# Patient Record
Sex: FEMALE | Race: WHITE | NOT HISPANIC OR LATINO | Employment: FULL TIME | URBAN - METROPOLITAN AREA
[De-identification: names, ages, dates, MRNs, and addresses within clinical notes are randomized per-mention and may not be internally consistent; named-entity substitution may affect disease eponyms.]

---

## 2018-03-23 ENCOUNTER — APPOINTMENT (OUTPATIENT)
Dept: ADMISSIONS | Facility: MEDICAL CENTER | Age: 58
End: 2018-03-23
Attending: ORTHOPAEDIC SURGERY
Payer: COMMERCIAL

## 2018-03-23 DIAGNOSIS — Z01.810 PRE-OPERATIVE CARDIOVASCULAR EXAMINATION: ICD-10-CM

## 2018-03-23 DIAGNOSIS — Z01.812 PRE-OPERATIVE LABORATORY EXAMINATION: ICD-10-CM

## 2018-03-23 LAB
EKG IMPRESSION: NORMAL
ERYTHROCYTE [DISTWIDTH] IN BLOOD BY AUTOMATED COUNT: 44.7 FL (ref 35.9–50)
HCT VFR BLD AUTO: 45.6 % (ref 37–47)
HGB BLD-MCNC: 15 G/DL (ref 12–16)
MCH RBC QN AUTO: 30.7 PG (ref 27–33)
MCHC RBC AUTO-ENTMCNC: 32.9 G/DL (ref 33.6–35)
MCV RBC AUTO: 93.3 FL (ref 81.4–97.8)
PLATELET # BLD AUTO: 273 K/UL (ref 164–446)
PMV BLD AUTO: 9.7 FL (ref 9–12.9)
RBC # BLD AUTO: 4.89 M/UL (ref 4.2–5.4)
WBC # BLD AUTO: 4 K/UL (ref 4.8–10.8)

## 2018-03-23 PROCEDURE — 85027 COMPLETE CBC AUTOMATED: CPT

## 2018-03-23 PROCEDURE — 93010 ELECTROCARDIOGRAM REPORT: CPT | Performed by: INTERNAL MEDICINE

## 2018-03-23 PROCEDURE — 36415 COLL VENOUS BLD VENIPUNCTURE: CPT

## 2018-03-23 PROCEDURE — 93005 ELECTROCARDIOGRAM TRACING: CPT

## 2018-03-23 RX ORDER — ACETAMINOPHEN 325 MG/1
650 TABLET ORAL EVERY 4 HOURS PRN
Status: ON HOLD | COMMUNITY
End: 2019-03-18

## 2018-03-23 NOTE — OR NURSING
Pre admit apt: Pt. Instructed to continue regularly prescribed medications through day before surgery.  Instructed to take the following medications, the day of surgery, with a sip of water per anesthesia protocol: Tylenol

## 2018-04-09 ENCOUNTER — HOSPITAL ENCOUNTER (OUTPATIENT)
Facility: MEDICAL CENTER | Age: 58
End: 2018-04-09
Attending: ORTHOPAEDIC SURGERY | Admitting: ORTHOPAEDIC SURGERY
Payer: COMMERCIAL

## 2018-04-09 VITALS
DIASTOLIC BLOOD PRESSURE: 82 MMHG | SYSTOLIC BLOOD PRESSURE: 132 MMHG | BODY MASS INDEX: 26.73 KG/M2 | HEART RATE: 62 BPM | HEIGHT: 68 IN | WEIGHT: 176.37 LBS | RESPIRATION RATE: 14 BRPM | TEMPERATURE: 96.8 F | OXYGEN SATURATION: 99 %

## 2018-04-09 DIAGNOSIS — G89.18 POSTOPERATIVE PAIN OF LEFT KNEE: ICD-10-CM

## 2018-04-09 DIAGNOSIS — M25.562 POSTOPERATIVE PAIN OF LEFT KNEE: ICD-10-CM

## 2018-04-09 PROCEDURE — 700111 HCHG RX REV CODE 636 W/ 250 OVERRIDE (IP)

## 2018-04-09 PROCEDURE — 700105 HCHG RX REV CODE 258: Performed by: ORTHOPAEDIC SURGERY

## 2018-04-09 PROCEDURE — 160046 HCHG PACU - 1ST 60 MINS PHASE II: Performed by: ORTHOPAEDIC SURGERY

## 2018-04-09 PROCEDURE — 160036 HCHG PACU - EA ADDL 30 MINS PHASE I: Performed by: ORTHOPAEDIC SURGERY

## 2018-04-09 PROCEDURE — A9270 NON-COVERED ITEM OR SERVICE: HCPCS

## 2018-04-09 PROCEDURE — 160025 RECOVERY II MINUTES (STATS): Performed by: ORTHOPAEDIC SURGERY

## 2018-04-09 PROCEDURE — 700101 HCHG RX REV CODE 250

## 2018-04-09 PROCEDURE — 160022 HCHG BLOCK: Performed by: ORTHOPAEDIC SURGERY

## 2018-04-09 PROCEDURE — 160048 HCHG OR STATISTICAL LEVEL 1-5: Performed by: ORTHOPAEDIC SURGERY

## 2018-04-09 PROCEDURE — 700102 HCHG RX REV CODE 250 W/ 637 OVERRIDE(OP)

## 2018-04-09 PROCEDURE — 502580 HCHG PACK, KNEE ARTHROSCOPY: Performed by: ORTHOPAEDIC SURGERY

## 2018-04-09 PROCEDURE — 160002 HCHG RECOVERY MINUTES (STAT): Performed by: ORTHOPAEDIC SURGERY

## 2018-04-09 PROCEDURE — 160035 HCHG PACU - 1ST 60 MINS PHASE I: Performed by: ORTHOPAEDIC SURGERY

## 2018-04-09 PROCEDURE — 500028 HCHG ARTHROWAND TURBOVAC 3.5/90 SUCT.: Performed by: ORTHOPAEDIC SURGERY

## 2018-04-09 PROCEDURE — 160029 HCHG SURGERY MINUTES - 1ST 30 MINS LEVEL 4: Performed by: ORTHOPAEDIC SURGERY

## 2018-04-09 PROCEDURE — 160041 HCHG SURGERY MINUTES - EA ADDL 1 MIN LEVEL 4: Performed by: ORTHOPAEDIC SURGERY

## 2018-04-09 PROCEDURE — 160009 HCHG ANES TIME/MIN: Performed by: ORTHOPAEDIC SURGERY

## 2018-04-09 PROCEDURE — 501838 HCHG SUTURE GENERAL: Performed by: ORTHOPAEDIC SURGERY

## 2018-04-09 RX ORDER — OXYCODONE HCL 5 MG/5 ML
SOLUTION, ORAL ORAL
Status: COMPLETED
Start: 2018-04-09 | End: 2018-04-09

## 2018-04-09 RX ORDER — LIDOCAINE HYDROCHLORIDE 10 MG/ML
INJECTION, SOLUTION INFILTRATION; PERINEURAL
Status: DISCONTINUED
Start: 2018-04-09 | End: 2018-04-09 | Stop reason: HOSPADM

## 2018-04-09 RX ORDER — ROPIVACAINE HYDROCHLORIDE 5 MG/ML
INJECTION, SOLUTION EPIDURAL; INFILTRATION; PERINEURAL
Status: DISCONTINUED
Start: 2018-04-09 | End: 2018-04-09 | Stop reason: HOSPADM

## 2018-04-09 RX ORDER — LEVOFLOXACIN 500 MG/1
500 TABLET, FILM COATED ORAL DAILY
Qty: 7 TAB | Refills: 0 | Status: SHIPPED | OUTPATIENT
Start: 2018-04-09 | End: 2018-04-16

## 2018-04-09 RX ORDER — ONDANSETRON 4 MG/1
4 TABLET, FILM COATED ORAL EVERY 4 HOURS PRN
Qty: 10 TAB | Refills: 2 | Status: SHIPPED | OUTPATIENT
Start: 2018-04-09 | End: 2018-04-19

## 2018-04-09 RX ORDER — HYDROCODONE BITARTRATE AND ACETAMINOPHEN 10; 325 MG/1; MG/1
1-2 TABLET ORAL EVERY 4 HOURS PRN
Qty: 60 TAB | Refills: 0 | Status: SHIPPED | OUTPATIENT
Start: 2018-04-09 | End: 2018-04-30

## 2018-04-09 RX ORDER — MIDAZOLAM HYDROCHLORIDE 1 MG/ML
INJECTION INTRAMUSCULAR; INTRAVENOUS
Status: DISCONTINUED
Start: 2018-04-09 | End: 2018-04-09 | Stop reason: HOSPADM

## 2018-04-09 RX ORDER — LIDOCAINE HYDROCHLORIDE 10 MG/ML
INJECTION, SOLUTION INFILTRATION; PERINEURAL
Status: COMPLETED
Start: 2018-04-09 | End: 2018-04-09

## 2018-04-09 RX ORDER — SODIUM CHLORIDE, SODIUM LACTATE, POTASSIUM CHLORIDE, CALCIUM CHLORIDE 600; 310; 30; 20 MG/100ML; MG/100ML; MG/100ML; MG/100ML
INJECTION, SOLUTION INTRAVENOUS
Status: DISCONTINUED | OUTPATIENT
Start: 2018-04-09 | End: 2018-04-09 | Stop reason: HOSPADM

## 2018-04-09 RX ORDER — ONDANSETRON 2 MG/ML
INJECTION INTRAMUSCULAR; INTRAVENOUS
Status: COMPLETED
Start: 2018-04-09 | End: 2018-04-09

## 2018-04-09 RX ADMIN — SODIUM CHLORIDE, POTASSIUM CHLORIDE, SODIUM LACTATE AND CALCIUM CHLORIDE: 600; 310; 30; 20 INJECTION, SOLUTION INTRAVENOUS at 10:00

## 2018-04-09 RX ADMIN — ONDANSETRON 4 MG: 2 INJECTION INTRAMUSCULAR; INTRAVENOUS at 09:40

## 2018-04-09 RX ADMIN — OXYCODONE HYDROCHLORIDE 5 MG: 5 SOLUTION ORAL at 09:05

## 2018-04-09 RX ADMIN — FENTANYL CITRATE 50 MCG: 50 INJECTION, SOLUTION INTRAMUSCULAR; INTRAVENOUS at 09:05

## 2018-04-09 RX ADMIN — SODIUM CHLORIDE, POTASSIUM CHLORIDE, SODIUM LACTATE AND CALCIUM CHLORIDE: 600; 310; 30; 20 INJECTION, SOLUTION INTRAVENOUS at 06:26

## 2018-04-09 RX ADMIN — LIDOCAINE HYDROCHLORIDE 0.1 ML: 10 INJECTION, SOLUTION INFILTRATION; PERINEURAL at 06:15

## 2018-04-09 RX ADMIN — FENTANYL CITRATE 50 MCG: 50 INJECTION, SOLUTION INTRAMUSCULAR; INTRAVENOUS at 09:10

## 2018-04-09 ASSESSMENT — PAIN SCALES - GENERAL
PAINLEVEL_OUTOF10: 5
PAINLEVEL_OUTOF10: 4
PAINLEVEL_OUTOF10: 0
PAINLEVEL_OUTOF10: 4
PAINLEVEL_OUTOF10: 0
PAINLEVEL_OUTOF10: 0

## 2018-04-09 NOTE — OR NURSING
Patient to preop, allergies and NPO status verified, home medications reconciled, belongings secured, verbalizes understanding of pain scale, surgical site verified, IV access established, SCDs/ FRANK hose in place.

## 2018-04-09 NOTE — OP REPORT
DATE OF SERVICE:  04/09/2018    PREOPERATIVE DIAGNOSIS:  Arthrofibrosis, left total knee arthroplasty.    POSTOPERATIVE DIAGNOSIS:  Arthrofibrosis, left total knee arthroplasty.    PROCEDURES:  Left knee arthroscopy, and synovectomy.    SURGEON:  Ruiz Moreno MD    ASSISTANT:  Jose Ashton MD    ANESTHESIA:  General with peripheral nerve block.    COMPLICATIONS:  None.    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  However, the patient developed   persistent stiffness and swelling after activity and was admitted for elective   left knee arthroscopy and arthroscopic synovectomy.    DESCRIPTION OF PROCEDURE:  After induction of satisfactory anesthesia, the   patient was positioned supine on the operating room table.  A tourniquet was   applied to the proximal left thigh, but was not inflated at any point during   the procedure.  The left lower extremity was prepped and draped in the usual   sterile fashion.  Intravenous antibiotics were administered and after the   usual prep and drape, the knee was distended by injecting the intraarticular   space with 60 mL of normal saline.  An inferolateral portal was made just   lateral to the patellar tendon at the level of the joint line for insertion of   the arthroscope which was inserted into the patellofemoral compartment.    There was considerable scar in the intra-articular space limiting   visualization and a superolateral portal was made just lateral to the proximal   pole of the patella where an arthroscopic shaver was introduced to debride   scar and allow visualization of the patellofemoral compartment.  This was used   alternating with an ArthroPort device  to minimize bleeding and resect scar   tissue.  There was considerable thick arthrofibrotic scar between the patella   and femoral component which was debrided so that the articular surface of the   patella could be well visualized, which was smooth.  Vertical adhesions and   thickened scar was identified circumferentially around the patella superiorly,   as well as the medial and lateral gutters and inferior joint space.  A   sequential debridement was performed utilizing a superomedial and   superolateral portal for the arthroscopic shaver as well as the ArthroPort   device to debride the thick arthrofibrotic scar circumferentially around the   patella and exposed the patellofemoral compartment by debriding the   intra-articular and peripheral scar tissue.  Dissection was continued up in   the suprapatellar pouch and an extensive synovectomy performed removing   arthrofibrotic scar from the synovium and further mobilizing the extensor   mechanism in this way.  There were a vertical bands in both the medial and   lateral gutters, similar to plica which were each thoroughly debrided and   resected.  The inferior space was then examined by moving the arthroscope into   the superolateral portal to visualize inferiorly and then utilizing the   inferolateral portal for the ArthroPort and shaver device as well as a   superomedial portal to debride the inferior space to the point where the   tibial insert could be visualized and the deep surface of the extensor   mechanism distal to the patellar debrided of thickened arthrofibrotic scar.    Once the debridement was continued and completed in this fashion   circumferentially around the patella and entering the suprapatellar pouch as   well as the medial and lateral gutters, the arthroscopic instruments were   removed and the knee suctioned from the intra-articular space of remaining   fluid followed by manipulating the knee into flexion where 100 degrees of   flexion was  achieved.  Each of the portal sites were then closed with single   nylon sutures and a sterile dressing applied as well as a compressive wrap   from toes to groin.  The patient tolerated the procedure well and returned to   recovery room in stable condition.       ____________________________________     MD SARINA Sainz / LIVIA    DD:  04/09/2018 08:53:56  DT:  04/09/2018 09:30:31    D#:  [id number]  Job#:  [id number]

## 2018-04-09 NOTE — DISCHARGE INSTRUCTIONS
"  ACTIVITY: Rest and take it easy for the first 24 hours.  A responsible adult is recommended to remain with you during that time.  It is normal to feel sleepy.  We encourage you to not do anything that requires balance, judgment or coordination.    MILD FLU-LIKE SYMPTOMS ARE NORMAL. YOU MAY EXPERIENCE GENERALIZED MUSCLE ACHES, THROAT IRRITATION, HEADACHE AND/OR SOME NAUSEA.    FOR 24 HOURS DO NOT:  Drive, operate machinery or run household appliances.  Drink beer or alcoholic beverages.   Make important decisions or sign legal documents.    SPECIAL INSTRUCTIONS: Ok to weight bear as tolerated. Keep dressing on until followup appointment. Ok to shower with dressing in place.     For pain take Norco as needed. zofran for nauseau. levaquin for one week for infection prophylaxis        Peripheral Nerve Block Discharge Instructions from Same Day Surgery and Inpatient :    What to Expect - Lower Extremity  · The block may cause you to experience numbness and weakness in your thigh and knee on the same side as your surgery  · Numbness, tingling and / or weakness are all normal. For some people, this may be an unpleasant sensation  · These issues will be resolved when the local anesthetic wears off   · You may experience numbness and tingling in your thigh on the same side as your surgery if the block medicine was injected at your groin area  · Numbness will make it difficult to walk  · You may have problems with balance and walking so be very careful   · Follow your surgeon's direction regarding weight bearing on your surgical limb  · Be very careful with your numb limb  Precautions  · The numbness may affect your balance  · Be careful when walking or moving around  · Your leg may be weak: be very careful putting weight on it  · If your surgeon did not specify a time, you should not bear weight for 24 hours    Pain Control  · The initial block on the day of surgery will make your extremity feel \"numb\"  · Any consecutive " "injection including prior to discharge from the hospital will make your extremity feel \"numb\"  · You may feel an aching or burning when the local anesthesia starts to wear off  · Take pain pills as prescribed by your surgeon  · Call your surgeon or anesthesiologist if you do not have adequate pain control      DIET: To avoid nausea, slowly advance diet as tolerated, avoiding spicy or greasy foods for the first day.  Add more substantial food to your diet according to your physician's instructions.  Babies can be fed formula or breast milk as soon as they are hungry.  INCREASE FLUIDS AND FIBER TO AVOID CONSTIPATION.    SURGICAL DRESSING/BATHING:  Ok to shower with dressing in place.  Keep dressing dry    FOLLOW-UP APPOINTMENT:  A follow-up appointment should be arranged with your doctor in 2 weeks; call to schedule.    You should CALL YOUR PHYSICIAN if you develop:  Fever greater than 101 degrees F.  Pain not relieved by medication, or persistent nausea or vomiting.  Excessive bleeding (blood soaking through dressing) or unexpected drainage from the wound.  Extreme redness or swelling around the incision site, drainage of pus or foul smelling drainage.  Inability to urinate or empty your bladder within 8 hours.  Problems with breathing or chest pain.    You should call 911 if you develop problems with breathing or chest pain.  If you are unable to contact your doctor or surgical center, you should go to the nearest emergency room or urgent care center.  Physician's telephone #: [phone]    If any questions arise, call your doctor.  If your doctor is not available, please feel free to call the Surgical Center at [phone].  The Center is open Monday through Friday from 7AM to 7PM.  You can also call the [employer] HOTLINE open 24 hours/day, 7 days/week and speak to a nurse at [phone], or toll free at [phone].    A registered nurse may call you a few days after your surgery to see how you are doing after " your procedure.    MEDICATIONS: Resume taking daily medication.  Take prescribed pain medication with food.  If no medication is prescribed, you may take non-aspirin pain medication if needed.  PAIN MEDICATION CAN BE VERY CONSTIPATING.  Take a stool softener or laxative such as senokot, pericolace, or milk of magnesia if needed.    Prescription given for levaquin,norco, zofran.  Last pain medication given at 9:05 next due at 1:05.    If your physician has prescribed pain medication that includes Acetaminophen (Tylenol), do not take additional Acetaminophen (Tylenol) while taking the prescribed medication.    Depression / Suicide Risk    As you are discharged from this [facility] facility, it is important to learn how to keep safe from harming yourself.    Recognize the warning signs:  · Abrupt changes in personality, positive or negative- including increase in energy   · Giving away possessions  · Change in eating patterns- significant weight changes-  positive or negative  · Change in sleeping patterns- unable to sleep or sleeping all the time   · Unwillingness or inability to communicate  · Depression  · Unusual sadness, discouragement and loneliness  · Talk of wanting to die  · Neglect of personal appearance   · Rebelliousness- reckless behavior  · Withdrawal from people/activities they love  · Confusion- inability to concentrate     If you or a loved one observes any of these behaviors or has concerns about self-harm, here's what you can do:  · Talk about it- your feelings and reasons for harming yourself  · Remove any means that you might use to hurt yourself (examples: pills, rope, extension cords, firearm)  · Get professional help from the community (Mental Health, Substance Abuse, psychological counseling)  · Do not be alone:Call your Safe Contact- someone whom you trust who will be there for you.  · Call your local CRISIS HOTLINE 889-7267 or 610-600-5562  · Call your local Children's Mobile Crisis  Response Team [facility] (255) 265-3673 or www.[employer]  · Call the toll free National Suicide Prevention Hotlines   · National Suicide Prevention Lifeline 106-318-IYYC (5325)  · National Hope Line Network 800-SUICIDE (437-3630)

## 2018-04-09 NOTE — OR NURSING
0949: Settled in recliner post short ambulation from [facility], pt tolerated dressing, pain is tolerable, having a little nausea, Quease Ease given, pt stated that it helped quite a bit, nausea decreased.  1022: Feeling nauseated only occasionally, feeling like being discharged. D/Pierre to care of family post uneventful stay in PACU 2.

## 2019-03-04 DIAGNOSIS — Z01.810 PRE-OPERATIVE CARDIOVASCULAR EXAMINATION: ICD-10-CM

## 2019-03-04 DIAGNOSIS — Z01.812 PRE-OPERATIVE LABORATORY EXAMINATION: ICD-10-CM

## 2019-03-04 LAB
ANION GAP SERPL CALC-SCNC: 9 MMOL/L (ref 0–11.9)
APTT PPP: 29.4 SEC (ref 24.7–36)
BUN SERPL-MCNC: 19 MG/DL (ref 8–22)
CALCIUM SERPL-MCNC: 9.3 MG/DL (ref 8.5–10.5)
CHLORIDE SERPL-SCNC: 104 MMOL/L (ref 96–112)
CO2 SERPL-SCNC: 26 MMOL/L (ref 20–33)
CREAT SERPL-MCNC: 0.74 MG/DL (ref 0.5–1.4)
EKG IMPRESSION: NORMAL
GLUCOSE SERPL-MCNC: 88 MG/DL (ref 65–99)
INR PPP: 1.04 (ref 0.87–1.13)
POTASSIUM SERPL-SCNC: 4.5 MMOL/L (ref 3.6–5.5)
PROTHROMBIN TIME: 13.7 SEC (ref 12–14.6)
SODIUM SERPL-SCNC: 139 MMOL/L (ref 135–145)

## 2019-03-04 PROCEDURE — 85610 PROTHROMBIN TIME: CPT

## 2019-03-04 PROCEDURE — 93005 ELECTROCARDIOGRAM TRACING: CPT

## 2019-03-04 PROCEDURE — 36415 COLL VENOUS BLD VENIPUNCTURE: CPT

## 2019-03-04 PROCEDURE — 85027 COMPLETE CBC AUTOMATED: CPT

## 2019-03-04 PROCEDURE — 87640 STAPH A DNA AMP PROBE: CPT

## 2019-03-04 PROCEDURE — 87641 MR-STAPH DNA AMP PROBE: CPT

## 2019-03-04 PROCEDURE — 80048 BASIC METABOLIC PNL TOTAL CA: CPT

## 2019-03-04 PROCEDURE — 93010 ELECTROCARDIOGRAM REPORT: CPT | Performed by: INTERNAL MEDICINE

## 2019-03-04 PROCEDURE — 85730 THROMBOPLASTIN TIME PARTIAL: CPT

## 2019-03-04 RX ORDER — SENNOSIDES 8.6 MG
CAPSULE ORAL PRN
Status: ON HOLD | COMMUNITY
End: 2019-03-18

## 2019-03-04 RX ORDER — HYDROCODONE BITARTRATE AND ACETAMINOPHEN 10; 325 MG/1; MG/1
0.5 TABLET ORAL EVERY 4 HOURS PRN
Status: ON HOLD | COMMUNITY
End: 2021-08-18

## 2019-03-04 NOTE — DISCHARGE PLANNING
DISCHARGE PLANNING NOTE - TOTAL JOINT     Procedure: Procedure(s):  KNEE REVISION TOTAL  Procedure Date: 3/18/2019  Insurance:  Payor: GUEVARAR / Plan: AMBETTER / Product Type: *No Product type* /   Equipment currently available at home? cane and front-wheel walker  Steps into the home? none  Steps within the home? 2 story will remain down in beginning  Toilet height? ADA  Type of shower? walk-in shower  Who will be with you during your recovery? Friend--Mariah  Is Outpatient Physical Therapy set up after surgery? Yes-4/1  Did you take the Total Joint Class and where? Yes, at [facility] -this is 3rd knee     Plan: Met with patient during preadmission appointment.  Reviewed Equipment Resource list and provided a copy to the patient.  Provided and reviewed Home Safety Checklist.  Quiet Hours information given and reviewed.  There are no identified discharge needs at this time.  Anticipate discharge home with family.  No Dme needed at this time.

## 2019-03-04 NOTE — OR NURSING
"Preadmit appointment: \" Preparing for your Procedure information\" sheet given to patient with verbal and written instructions. Patient instructed to continue prescribed medications through the day before surgery, instructed to take the following medications the day of surgery per anesthesia protocol: NORCO if needed. No symptoms of UTI.  "

## 2019-03-05 LAB
ERYTHROCYTE [DISTWIDTH] IN BLOOD BY AUTOMATED COUNT: 47.1 FL (ref 35.9–50)
HCT VFR BLD AUTO: 46.1 % (ref 37–47)
HGB BLD-MCNC: 14.8 G/DL (ref 12–16)
MCH RBC QN AUTO: 30.3 PG (ref 27–33)
MCHC RBC AUTO-ENTMCNC: 32.1 G/DL (ref 33.6–35)
MCV RBC AUTO: 94.3 FL (ref 81.4–97.8)
PLATELET # BLD AUTO: 280 K/UL (ref 164–446)
PMV BLD AUTO: 9.2 FL (ref 9–12.9)
RBC # BLD AUTO: 4.89 M/UL (ref 4.2–5.4)
SCCMEC + MECA PNL NOSE NAA+PROBE: NEGATIVE
SCCMEC + MECA PNL NOSE NAA+PROBE: NEGATIVE
WBC # BLD AUTO: 4.6 K/UL (ref 4.8–10.8)

## 2019-03-18 ENCOUNTER — ANESTHESIA (OUTPATIENT)
Dept: SURGERY | Facility: MEDICAL CENTER | Age: 59
DRG: 468 | End: 2019-03-18
Payer: COMMERCIAL

## 2019-03-18 ENCOUNTER — APPOINTMENT (OUTPATIENT)
Dept: RADIOLOGY | Facility: MEDICAL CENTER | Age: 59
DRG: 468 | End: 2019-03-18
Attending: STUDENT IN AN ORGANIZED HEALTH CARE EDUCATION/TRAINING PROGRAM
Payer: COMMERCIAL

## 2019-03-18 ENCOUNTER — HOSPITAL ENCOUNTER (INPATIENT)
Facility: MEDICAL CENTER | Age: 59
LOS: 1 days | DRG: 468 | End: 2019-03-19
Attending: ORTHOPAEDIC SURGERY | Admitting: ORTHOPAEDIC SURGERY
Payer: COMMERCIAL

## 2019-03-18 ENCOUNTER — ANESTHESIA EVENT (OUTPATIENT)
Dept: SURGERY | Facility: MEDICAL CENTER | Age: 59
DRG: 468 | End: 2019-03-18
Payer: COMMERCIAL

## 2019-03-18 DIAGNOSIS — Z96.652 STATUS POST REVISION OF TOTAL REPLACEMENT OF LEFT KNEE: ICD-10-CM

## 2019-03-18 LAB
ABO GROUP BLD: NORMAL
ABO GROUP BLD: NORMAL
BLD GP AB SCN SERPL QL: NORMAL
RH BLD: NORMAL
RH BLD: NORMAL

## 2019-03-18 PROCEDURE — 160009 HCHG ANES TIME/MIN: Performed by: ORTHOPAEDIC SURGERY

## 2019-03-18 PROCEDURE — 700105 HCHG RX REV CODE 258: Performed by: ANESTHESIOLOGY

## 2019-03-18 PROCEDURE — 502000 HCHG MISC OR IMPLANTS RC 0278: Performed by: ORTHOPAEDIC SURGERY

## 2019-03-18 PROCEDURE — 700111 HCHG RX REV CODE 636 W/ 250 OVERRIDE (IP)

## 2019-03-18 PROCEDURE — 0SPD0JZ REMOVAL OF SYNTHETIC SUBSTITUTE FROM LEFT KNEE JOINT, OPEN APPROACH: ICD-10-PCS | Performed by: ORTHOPAEDIC SURGERY

## 2019-03-18 PROCEDURE — 700101 HCHG RX REV CODE 250: Performed by: STUDENT IN AN ORGANIZED HEALTH CARE EDUCATION/TRAINING PROGRAM

## 2019-03-18 PROCEDURE — 160036 HCHG PACU - EA ADDL 30 MINS PHASE I: Performed by: ORTHOPAEDIC SURGERY

## 2019-03-18 PROCEDURE — 160035 HCHG PACU - 1ST 60 MINS PHASE I: Performed by: ORTHOPAEDIC SURGERY

## 2019-03-18 PROCEDURE — A9270 NON-COVERED ITEM OR SERVICE: HCPCS | Performed by: ANESTHESIOLOGY

## 2019-03-18 PROCEDURE — 86850 RBC ANTIBODY SCREEN: CPT

## 2019-03-18 PROCEDURE — 700102 HCHG RX REV CODE 250 W/ 637 OVERRIDE(OP): Performed by: ANESTHESIOLOGY

## 2019-03-18 PROCEDURE — 0SRD069 REPLACEMENT OF LEFT KNEE JOINT WITH OXIDIZED ZIRCONIUM ON POLYETHYLENE SYNTHETIC SUBSTITUTE, CEMENTED, OPEN APPROACH: ICD-10-PCS | Performed by: ORTHOPAEDIC SURGERY

## 2019-03-18 PROCEDURE — C1776 JOINT DEVICE (IMPLANTABLE): HCPCS | Performed by: ORTHOPAEDIC SURGERY

## 2019-03-18 PROCEDURE — 86900 BLOOD TYPING SEROLOGIC ABO: CPT

## 2019-03-18 PROCEDURE — A9270 NON-COVERED ITEM OR SERVICE: HCPCS | Performed by: STUDENT IN AN ORGANIZED HEALTH CARE EDUCATION/TRAINING PROGRAM

## 2019-03-18 PROCEDURE — 700102 HCHG RX REV CODE 250 W/ 637 OVERRIDE(OP): Performed by: STUDENT IN AN ORGANIZED HEALTH CARE EDUCATION/TRAINING PROGRAM

## 2019-03-18 PROCEDURE — 94760 N-INVAS EAR/PLS OXIMETRY 1: CPT

## 2019-03-18 PROCEDURE — 160002 HCHG RECOVERY MINUTES (STAT): Performed by: ORTHOPAEDIC SURGERY

## 2019-03-18 PROCEDURE — 700105 HCHG RX REV CODE 258: Performed by: STUDENT IN AN ORGANIZED HEALTH CARE EDUCATION/TRAINING PROGRAM

## 2019-03-18 PROCEDURE — 700101 HCHG RX REV CODE 250

## 2019-03-18 PROCEDURE — 86901 BLOOD TYPING SEROLOGIC RH(D): CPT

## 2019-03-18 PROCEDURE — 501838 HCHG SUTURE GENERAL: Performed by: ORTHOPAEDIC SURGERY

## 2019-03-18 PROCEDURE — 160048 HCHG OR STATISTICAL LEVEL 1-5: Performed by: ORTHOPAEDIC SURGERY

## 2019-03-18 PROCEDURE — 770001 HCHG ROOM/CARE - MED/SURG/GYN PRIV*

## 2019-03-18 PROCEDURE — 160022 HCHG BLOCK: Performed by: ORTHOPAEDIC SURGERY

## 2019-03-18 PROCEDURE — 502240 HCHG MISC OR SUPPLY RC 0272: Performed by: ORTHOPAEDIC SURGERY

## 2019-03-18 PROCEDURE — 700111 HCHG RX REV CODE 636 W/ 250 OVERRIDE (IP): Performed by: STUDENT IN AN ORGANIZED HEALTH CARE EDUCATION/TRAINING PROGRAM

## 2019-03-18 PROCEDURE — 700101 HCHG RX REV CODE 250: Performed by: ANESTHESIOLOGY

## 2019-03-18 PROCEDURE — 700111 HCHG RX REV CODE 636 W/ 250 OVERRIDE (IP): Performed by: ANESTHESIOLOGY

## 2019-03-18 PROCEDURE — 700101 HCHG RX REV CODE 250: Performed by: ORTHOPAEDIC SURGERY

## 2019-03-18 PROCEDURE — 700112 HCHG RX REV CODE 229: Performed by: STUDENT IN AN ORGANIZED HEALTH CARE EDUCATION/TRAINING PROGRAM

## 2019-03-18 PROCEDURE — 160041 HCHG SURGERY MINUTES - EA ADDL 1 MIN LEVEL 4: Performed by: ORTHOPAEDIC SURGERY

## 2019-03-18 PROCEDURE — 73560 X-RAY EXAM OF KNEE 1 OR 2: CPT | Mod: LT

## 2019-03-18 PROCEDURE — 502579 HCHG PACK, TOTAL KNEE: Performed by: ORTHOPAEDIC SURGERY

## 2019-03-18 PROCEDURE — L8699 PROSTHETIC IMPLANT NOS: HCPCS | Performed by: ORTHOPAEDIC SURGERY

## 2019-03-18 PROCEDURE — 160029 HCHG SURGERY MINUTES - 1ST 30 MINS LEVEL 4: Performed by: ORTHOPAEDIC SURGERY

## 2019-03-18 DEVICE — CEMENT BONE SIMPLEX W/GENTAICIN (10/PK): Type: IMPLANTABLE DEVICE | Site: KNEE | Status: FUNCTIONAL

## 2019-03-18 DEVICE — COUPLER OFFSET LEGION 4MM: Type: IMPLANTABLE DEVICE | Site: KNEE | Status: FUNCTIONAL

## 2019-03-18 DEVICE — IMPLANTABLE DEVICE: Type: IMPLANTABLE DEVICE | Site: KNEE | Status: FUNCTIONAL

## 2019-03-18 DEVICE — FEMORAL CONSTRAINED OXINIUM FEMALE LEGION LEFT 4 (1EA): Type: IMPLANTABLE DEVICE | Site: KNEE | Status: FUNCTIONAL

## 2019-03-18 DEVICE — BASEPLATE TIBIAL FEMALE LEGION SIZE LEFT 3 (1EA): Type: IMPLANTABLE DEVICE | Site: KNEE | Status: FUNCTIONAL

## 2019-03-18 RX ORDER — LORAZEPAM 1 MG/1
0.5 TABLET ORAL EVERY 4 HOURS PRN
Status: DISCONTINUED | OUTPATIENT
Start: 2019-03-18 | End: 2019-03-19 | Stop reason: HOSPADM

## 2019-03-18 RX ORDER — CELECOXIB 200 MG/1
200 CAPSULE ORAL 2 TIMES DAILY
Status: DISCONTINUED | OUTPATIENT
Start: 2019-03-19 | End: 2019-03-19 | Stop reason: HOSPADM

## 2019-03-18 RX ORDER — MIDAZOLAM HYDROCHLORIDE 1 MG/ML
INJECTION INTRAMUSCULAR; INTRAVENOUS
Status: DISPENSED
Start: 2019-03-18 | End: 2019-03-19

## 2019-03-18 RX ORDER — IPRATROPIUM BROMIDE AND ALBUTEROL SULFATE 2.5; .5 MG/3ML; MG/3ML
3 SOLUTION RESPIRATORY (INHALATION)
Status: DISCONTINUED | OUTPATIENT
Start: 2019-03-18 | End: 2019-03-18 | Stop reason: HOSPADM

## 2019-03-18 RX ORDER — AMOXICILLIN 250 MG
1 CAPSULE ORAL
Status: DISCONTINUED | OUTPATIENT
Start: 2019-03-18 | End: 2019-03-19 | Stop reason: HOSPADM

## 2019-03-18 RX ORDER — TRANEXAMIC ACID 100 MG/ML
INJECTION, SOLUTION INTRAVENOUS PRN
Status: DISCONTINUED | OUTPATIENT
Start: 2019-03-18 | End: 2019-03-18 | Stop reason: SURG

## 2019-03-18 RX ORDER — CEFAZOLIN SODIUM 1 G/3ML
INJECTION, POWDER, FOR SOLUTION INTRAMUSCULAR; INTRAVENOUS PRN
Status: DISCONTINUED | OUTPATIENT
Start: 2019-03-18 | End: 2019-03-18 | Stop reason: SURG

## 2019-03-18 RX ORDER — ENEMA 19; 7 G/133ML; G/133ML
1 ENEMA RECTAL
Status: DISCONTINUED | OUTPATIENT
Start: 2019-03-18 | End: 2019-03-19 | Stop reason: HOSPADM

## 2019-03-18 RX ORDER — DEXAMETHASONE SODIUM PHOSPHATE 4 MG/ML
INJECTION, SOLUTION INTRA-ARTICULAR; INTRALESIONAL; INTRAMUSCULAR; INTRAVENOUS; SOFT TISSUE PRN
Status: DISCONTINUED | OUTPATIENT
Start: 2019-03-18 | End: 2019-03-18 | Stop reason: SURG

## 2019-03-18 RX ORDER — CELECOXIB 200 MG/1
200 CAPSULE ORAL ONCE
Status: COMPLETED | OUTPATIENT
Start: 2019-03-18 | End: 2019-03-18

## 2019-03-18 RX ORDER — ACETAMINOPHEN 500 MG
1000 TABLET ORAL ONCE
Status: COMPLETED | OUTPATIENT
Start: 2019-03-18 | End: 2019-03-18

## 2019-03-18 RX ORDER — DEXTROSE, SODIUM CHLORIDE, SODIUM LACTATE, POTASSIUM CHLORIDE, AND CALCIUM CHLORIDE 5; .6; .31; .03; .02 G/100ML; G/100ML; G/100ML; G/100ML; G/100ML
INJECTION, SOLUTION INTRAVENOUS CONTINUOUS
Status: CANCELLED | OUTPATIENT
Start: 2019-03-18 | End: 2019-03-18

## 2019-03-18 RX ORDER — SODIUM CHLORIDE, SODIUM LACTATE, POTASSIUM CHLORIDE, CALCIUM CHLORIDE 600; 310; 30; 20 MG/100ML; MG/100ML; MG/100ML; MG/100ML
INJECTION, SOLUTION INTRAVENOUS
Status: DISCONTINUED | OUTPATIENT
Start: 2019-03-18 | End: 2019-03-18 | Stop reason: SURG

## 2019-03-18 RX ORDER — SODIUM CHLORIDE, SODIUM LACTATE, POTASSIUM CHLORIDE, CALCIUM CHLORIDE 600; 310; 30; 20 MG/100ML; MG/100ML; MG/100ML; MG/100ML
INJECTION, SOLUTION INTRAVENOUS CONTINUOUS
Status: DISCONTINUED | OUTPATIENT
Start: 2019-03-18 | End: 2019-03-18 | Stop reason: HOSPADM

## 2019-03-18 RX ORDER — ONDANSETRON 4 MG/1
4 TABLET, ORALLY DISINTEGRATING ORAL EVERY 4 HOURS PRN
Status: DISCONTINUED | OUTPATIENT
Start: 2019-03-18 | End: 2019-03-19 | Stop reason: HOSPADM

## 2019-03-18 RX ORDER — AMOXICILLIN 250 MG
1 CAPSULE ORAL NIGHTLY
Status: DISCONTINUED | OUTPATIENT
Start: 2019-03-18 | End: 2019-03-19 | Stop reason: HOSPADM

## 2019-03-18 RX ORDER — BISACODYL 10 MG
10 SUPPOSITORY, RECTAL RECTAL
Status: DISCONTINUED | OUTPATIENT
Start: 2019-03-18 | End: 2019-03-19 | Stop reason: HOSPADM

## 2019-03-18 RX ORDER — ONDANSETRON 2 MG/ML
4 INJECTION INTRAMUSCULAR; INTRAVENOUS
Status: DISCONTINUED | OUTPATIENT
Start: 2019-03-18 | End: 2019-03-18 | Stop reason: HOSPADM

## 2019-03-18 RX ORDER — OXYCODONE HCL 5 MG/5 ML
5 SOLUTION, ORAL ORAL
Status: COMPLETED | OUTPATIENT
Start: 2019-03-18 | End: 2019-03-18

## 2019-03-18 RX ORDER — DIPHENHYDRAMINE HYDROCHLORIDE 50 MG/ML
12.5 INJECTION INTRAMUSCULAR; INTRAVENOUS
Status: DISCONTINUED | OUTPATIENT
Start: 2019-03-18 | End: 2019-03-18 | Stop reason: HOSPADM

## 2019-03-18 RX ORDER — HYDROMORPHONE HYDROCHLORIDE 2 MG/ML
0.1 INJECTION, SOLUTION INTRAMUSCULAR; INTRAVENOUS; SUBCUTANEOUS
Status: DISCONTINUED | OUTPATIENT
Start: 2019-03-18 | End: 2019-03-18 | Stop reason: HOSPADM

## 2019-03-18 RX ORDER — TRAMADOL HYDROCHLORIDE 50 MG/1
50 TABLET ORAL EVERY 4 HOURS PRN
Status: DISCONTINUED | OUTPATIENT
Start: 2019-03-18 | End: 2019-03-19 | Stop reason: HOSPADM

## 2019-03-18 RX ORDER — OXYCODONE HCL 5 MG/5 ML
10 SOLUTION, ORAL ORAL
Status: COMPLETED | OUTPATIENT
Start: 2019-03-18 | End: 2019-03-18

## 2019-03-18 RX ORDER — GABAPENTIN 300 MG/1
300 CAPSULE ORAL ONCE
Status: COMPLETED | OUTPATIENT
Start: 2019-03-18 | End: 2019-03-18

## 2019-03-18 RX ORDER — SCOLOPAMINE TRANSDERMAL SYSTEM 1 MG/1
1 PATCH, EXTENDED RELEASE TRANSDERMAL
Status: DISCONTINUED | OUTPATIENT
Start: 2019-03-18 | End: 2019-03-19 | Stop reason: HOSPADM

## 2019-03-18 RX ORDER — LEVOFLOXACIN 250 MG/1
750 TABLET, FILM COATED ORAL EVERY 24 HOURS
Status: DISCONTINUED | OUTPATIENT
Start: 2019-03-19 | End: 2019-03-19 | Stop reason: HOSPADM

## 2019-03-18 RX ORDER — HYDROMORPHONE HYDROCHLORIDE 2 MG/ML
INJECTION, SOLUTION INTRAMUSCULAR; INTRAVENOUS; SUBCUTANEOUS PRN
Status: DISCONTINUED | OUTPATIENT
Start: 2019-03-18 | End: 2019-03-18 | Stop reason: SURG

## 2019-03-18 RX ORDER — HYDROMORPHONE HYDROCHLORIDE 2 MG/ML
0.2 INJECTION, SOLUTION INTRAMUSCULAR; INTRAVENOUS; SUBCUTANEOUS
Status: DISCONTINUED | OUTPATIENT
Start: 2019-03-18 | End: 2019-03-18 | Stop reason: HOSPADM

## 2019-03-18 RX ORDER — ONDANSETRON 2 MG/ML
INJECTION INTRAMUSCULAR; INTRAVENOUS PRN
Status: DISCONTINUED | OUTPATIENT
Start: 2019-03-18 | End: 2019-03-18 | Stop reason: SURG

## 2019-03-18 RX ORDER — CHLORPROMAZINE HYDROCHLORIDE 25 MG/1
25 TABLET, FILM COATED ORAL EVERY 6 HOURS PRN
Status: DISCONTINUED | OUTPATIENT
Start: 2019-03-18 | End: 2019-03-19 | Stop reason: HOSPADM

## 2019-03-18 RX ORDER — DIPHENHYDRAMINE HYDROCHLORIDE 50 MG/ML
25 INJECTION INTRAMUSCULAR; INTRAVENOUS EVERY 6 HOURS PRN
Status: DISCONTINUED | OUTPATIENT
Start: 2019-03-18 | End: 2019-03-19 | Stop reason: HOSPADM

## 2019-03-18 RX ORDER — HYDROMORPHONE HYDROCHLORIDE 2 MG/ML
0.4 INJECTION, SOLUTION INTRAMUSCULAR; INTRAVENOUS; SUBCUTANEOUS
Status: DISCONTINUED | OUTPATIENT
Start: 2019-03-18 | End: 2019-03-18 | Stop reason: HOSPADM

## 2019-03-18 RX ORDER — BUPIVACAINE HYDROCHLORIDE 5 MG/ML
INJECTION, SOLUTION EPIDURAL; INTRACAUDAL PRN
Status: DISCONTINUED | OUTPATIENT
Start: 2019-03-18 | End: 2019-03-18 | Stop reason: SURG

## 2019-03-18 RX ORDER — DEXAMETHASONE SODIUM PHOSPHATE 4 MG/ML
4 INJECTION, SOLUTION INTRA-ARTICULAR; INTRALESIONAL; INTRAMUSCULAR; INTRAVENOUS; SOFT TISSUE
Status: DISCONTINUED | OUTPATIENT
Start: 2019-03-18 | End: 2019-03-19 | Stop reason: HOSPADM

## 2019-03-18 RX ORDER — DEXAMETHASONE SODIUM PHOSPHATE 4 MG/ML
10 INJECTION, SOLUTION INTRA-ARTICULAR; INTRALESIONAL; INTRAMUSCULAR; INTRAVENOUS; SOFT TISSUE ONCE
Status: COMPLETED | OUTPATIENT
Start: 2019-03-19 | End: 2019-03-19

## 2019-03-18 RX ORDER — CHLORPROMAZINE HYDROCHLORIDE 25 MG/ML
25 INJECTION INTRAMUSCULAR EVERY 6 HOURS PRN
Status: DISCONTINUED | OUTPATIENT
Start: 2019-03-18 | End: 2019-03-19 | Stop reason: HOSPADM

## 2019-03-18 RX ORDER — HYDROMORPHONE HYDROCHLORIDE 1 MG/ML
0.5 INJECTION, SOLUTION INTRAMUSCULAR; INTRAVENOUS; SUBCUTANEOUS
Status: DISCONTINUED | OUTPATIENT
Start: 2019-03-18 | End: 2019-03-19 | Stop reason: HOSPADM

## 2019-03-18 RX ORDER — MEPERIDINE HYDROCHLORIDE 50 MG/ML
12.5 INJECTION INTRAMUSCULAR; INTRAVENOUS; SUBCUTANEOUS
Status: DISCONTINUED | OUTPATIENT
Start: 2019-03-18 | End: 2019-03-18 | Stop reason: HOSPADM

## 2019-03-18 RX ORDER — HYDROCODONE BITARTRATE AND ACETAMINOPHEN 10; 325 MG/1; MG/1
1-2 TABLET ORAL EVERY 4 HOURS PRN
Status: DISCONTINUED | OUTPATIENT
Start: 2019-03-18 | End: 2019-03-19 | Stop reason: HOSPADM

## 2019-03-18 RX ORDER — KETAMINE HYDROCHLORIDE 50 MG/ML
INJECTION, SOLUTION INTRAMUSCULAR; INTRAVENOUS PRN
Status: DISCONTINUED | OUTPATIENT
Start: 2019-03-18 | End: 2019-03-18 | Stop reason: SURG

## 2019-03-18 RX ORDER — HALOPERIDOL 5 MG/ML
1 INJECTION INTRAMUSCULAR EVERY 6 HOURS PRN
Status: DISCONTINUED | OUTPATIENT
Start: 2019-03-18 | End: 2019-03-19 | Stop reason: HOSPADM

## 2019-03-18 RX ORDER — ZOLPIDEM TARTRATE 5 MG/1
5 TABLET ORAL NIGHTLY PRN
Status: DISCONTINUED | OUTPATIENT
Start: 2019-03-19 | End: 2019-03-19 | Stop reason: HOSPADM

## 2019-03-18 RX ORDER — DIPHENHYDRAMINE HCL 25 MG
25 TABLET ORAL EVERY 6 HOURS PRN
Status: DISCONTINUED | OUTPATIENT
Start: 2019-03-18 | End: 2019-03-19 | Stop reason: HOSPADM

## 2019-03-18 RX ORDER — DOCUSATE SODIUM 100 MG/1
100 CAPSULE, LIQUID FILLED ORAL 2 TIMES DAILY
Status: DISCONTINUED | OUTPATIENT
Start: 2019-03-18 | End: 2019-03-19 | Stop reason: HOSPADM

## 2019-03-18 RX ORDER — SODIUM CHLORIDE, SODIUM LACTATE, POTASSIUM CHLORIDE, CALCIUM CHLORIDE 600; 310; 30; 20 MG/100ML; MG/100ML; MG/100ML; MG/100ML
1000 INJECTION, SOLUTION INTRAVENOUS
Status: COMPLETED | OUTPATIENT
Start: 2019-03-18 | End: 2019-03-18

## 2019-03-18 RX ORDER — HALOPERIDOL 5 MG/ML
1 INJECTION INTRAMUSCULAR
Status: DISCONTINUED | OUTPATIENT
Start: 2019-03-18 | End: 2019-03-18 | Stop reason: HOSPADM

## 2019-03-18 RX ORDER — ONDANSETRON 2 MG/ML
4 INJECTION INTRAMUSCULAR; INTRAVENOUS EVERY 4 HOURS PRN
Status: DISCONTINUED | OUTPATIENT
Start: 2019-03-18 | End: 2019-03-19 | Stop reason: HOSPADM

## 2019-03-18 RX ORDER — DIAZEPAM 5 MG/1
5 TABLET ORAL EVERY 6 HOURS PRN
Status: DISCONTINUED | OUTPATIENT
Start: 2019-03-18 | End: 2019-03-19 | Stop reason: HOSPADM

## 2019-03-18 RX ORDER — ACETAMINOPHEN 500 MG
1000 TABLET ORAL EVERY 6 HOURS
Status: DISCONTINUED | OUTPATIENT
Start: 2019-03-18 | End: 2019-03-19 | Stop reason: HOSPADM

## 2019-03-18 RX ORDER — KETOROLAC TROMETHAMINE 30 MG/ML
30 INJECTION, SOLUTION INTRAMUSCULAR; INTRAVENOUS EVERY 6 HOURS
Status: COMPLETED | OUTPATIENT
Start: 2019-03-18 | End: 2019-03-19

## 2019-03-18 RX ORDER — MIDAZOLAM HYDROCHLORIDE 1 MG/ML
1 INJECTION INTRAMUSCULAR; INTRAVENOUS
Status: DISCONTINUED | OUTPATIENT
Start: 2019-03-18 | End: 2019-03-18 | Stop reason: HOSPADM

## 2019-03-18 RX ORDER — POLYETHYLENE GLYCOL 3350 17 G/17G
1 POWDER, FOR SOLUTION ORAL 2 TIMES DAILY PRN
Status: DISCONTINUED | OUTPATIENT
Start: 2019-03-18 | End: 2019-03-19 | Stop reason: HOSPADM

## 2019-03-18 RX ADMIN — MIDAZOLAM HYDROCHLORIDE 2 MG: 1 INJECTION, SOLUTION INTRAMUSCULAR; INTRAVENOUS at 08:48

## 2019-03-18 RX ADMIN — FENTANYL CITRATE 25 MCG: 50 INJECTION, SOLUTION INTRAMUSCULAR; INTRAVENOUS at 08:48

## 2019-03-18 RX ADMIN — KETAMINE HYDROCHLORIDE 10 MG: 50 INJECTION INTRAMUSCULAR; INTRAVENOUS at 10:55

## 2019-03-18 RX ADMIN — SCOPALAMINE 1 PATCH: 1 PATCH, EXTENDED RELEASE TRANSDERMAL at 17:34

## 2019-03-18 RX ADMIN — KETAMINE HYDROCHLORIDE 25 MG: 50 INJECTION INTRAMUSCULAR; INTRAVENOUS at 09:32

## 2019-03-18 RX ADMIN — OXYCODONE HYDROCHLORIDE 5 MG: 5 SOLUTION ORAL at 14:45

## 2019-03-18 RX ADMIN — SODIUM CHLORIDE, POTASSIUM CHLORIDE, SODIUM LACTATE AND CALCIUM CHLORIDE: 600; 310; 30; 20 INJECTION, SOLUTION INTRAVENOUS at 09:15

## 2019-03-18 RX ADMIN — FENTANYL CITRATE 25 MCG: 50 INJECTION, SOLUTION INTRAMUSCULAR; INTRAVENOUS at 13:49

## 2019-03-18 RX ADMIN — FENTANYL CITRATE 75 MCG: 50 INJECTION, SOLUTION INTRAMUSCULAR; INTRAVENOUS at 09:32

## 2019-03-18 RX ADMIN — PROPOFOL 200 MG: 10 INJECTION, EMULSION INTRAVENOUS at 09:32

## 2019-03-18 RX ADMIN — ONDANSETRON 4 MG: 2 INJECTION INTRAMUSCULAR; INTRAVENOUS at 09:18

## 2019-03-18 RX ADMIN — MIDAZOLAM HYDROCHLORIDE 1 MG: 1 INJECTION INTRAMUSCULAR; INTRAVENOUS at 13:19

## 2019-03-18 RX ADMIN — TRANEXAMIC ACID 1000 MG: 100 INJECTION, SOLUTION INTRAVENOUS at 13:35

## 2019-03-18 RX ADMIN — HYDROMORPHONE HYDROCHLORIDE 0.5 MG: 1 INJECTION, SOLUTION INTRAMUSCULAR; INTRAVENOUS; SUBCUTANEOUS at 16:21

## 2019-03-18 RX ADMIN — FENTANYL CITRATE 25 MCG: 50 INJECTION, SOLUTION INTRAMUSCULAR; INTRAVENOUS at 12:53

## 2019-03-18 RX ADMIN — ACETAMINOPHEN 1000 MG: 500 TABLET, COATED ORAL at 07:29

## 2019-03-18 RX ADMIN — DAPTOMYCIN 520 MG: 500 INJECTION, POWDER, LYOPHILIZED, FOR SOLUTION INTRAVENOUS at 21:43

## 2019-03-18 RX ADMIN — HYDROMORPHONE HYDROCHLORIDE 1 MG: 2 INJECTION, SOLUTION INTRAMUSCULAR; INTRAVENOUS; SUBCUTANEOUS at 10:00

## 2019-03-18 RX ADMIN — ONDANSETRON 4 MG: 2 INJECTION INTRAMUSCULAR; INTRAVENOUS at 12:15

## 2019-03-18 RX ADMIN — HYDROCODONE BITARTRATE AND ACETAMINOPHEN 1 TABLET: 10; 325 TABLET ORAL at 19:06

## 2019-03-18 RX ADMIN — DEXAMETHASONE SODIUM PHOSPHATE 8 MG: 4 INJECTION, SOLUTION INTRAMUSCULAR; INTRAVENOUS at 09:18

## 2019-03-18 RX ADMIN — DOCUSATE SODIUM AND SENNOSIDES 1 TABLET: 8.6; 5 TABLET, FILM COATED ORAL at 20:42

## 2019-03-18 RX ADMIN — GABAPENTIN 300 MG: 300 CAPSULE ORAL at 07:29

## 2019-03-18 RX ADMIN — HYDROMORPHONE HYDROCHLORIDE 0.5 MG: 1 INJECTION, SOLUTION INTRAMUSCULAR; INTRAVENOUS; SUBCUTANEOUS at 20:42

## 2019-03-18 RX ADMIN — KETOROLAC TROMETHAMINE 30 MG: 30 INJECTION, SOLUTION INTRAMUSCULAR; INTRAVENOUS at 23:13

## 2019-03-18 RX ADMIN — ACETAMINOPHEN 1000 MG: 500 TABLET, FILM COATED ORAL at 16:21

## 2019-03-18 RX ADMIN — HYDROMORPHONE HYDROCHLORIDE 1 MG: 2 INJECTION, SOLUTION INTRAMUSCULAR; INTRAVENOUS; SUBCUTANEOUS at 10:55

## 2019-03-18 RX ADMIN — DOCUSATE SODIUM 100 MG: 100 CAPSULE, LIQUID FILLED ORAL at 16:33

## 2019-03-18 RX ADMIN — SODIUM CHLORIDE, SODIUM LACTATE, POTASSIUM CHLORIDE, CALCIUM CHLORIDE 1000 ML: 600; 310; 30; 20 INJECTION, SOLUTION INTRAVENOUS at 07:29

## 2019-03-18 RX ADMIN — BUPIVACAINE HYDROCHLORIDE 20 ML: 5 INJECTION, SOLUTION EPIDURAL; INTRACAUDAL; PERINEURAL at 08:48

## 2019-03-18 RX ADMIN — RIVAROXABAN 10 MG: 10 TABLET, FILM COATED ORAL at 20:41

## 2019-03-18 RX ADMIN — CEFAZOLIN 2 G: 1 INJECTION, POWDER, FOR SOLUTION INTRAVENOUS at 09:35

## 2019-03-18 RX ADMIN — TRANEXAMIC ACID 1 G: 100 INJECTION, SOLUTION INTRAVENOUS at 09:38

## 2019-03-18 RX ADMIN — SODIUM CHLORIDE, SODIUM LACTATE, POTASSIUM CHLORIDE, CALCIUM CHLORIDE: 600; 310; 30; 20 INJECTION, SOLUTION INTRAVENOUS at 13:50

## 2019-03-18 RX ADMIN — LIDOCAINE HYDROCHLORIDE 100 MG: 20 INJECTION, SOLUTION INFILTRATION; PERINEURAL at 09:32

## 2019-03-18 RX ADMIN — KETOROLAC TROMETHAMINE 30 MG: 30 INJECTION, SOLUTION INTRAMUSCULAR; INTRAVENOUS at 16:21

## 2019-03-18 RX ADMIN — HYDROCODONE BITARTRATE AND ACETAMINOPHEN 1 TABLET: 10; 325 TABLET ORAL at 23:13

## 2019-03-18 RX ADMIN — HYDROCODONE BITARTRATE AND ACETAMINOPHEN 1 TABLET: 10; 325 TABLET ORAL at 17:35

## 2019-03-18 RX ADMIN — LIDOCAINE HYDROCHLORIDE 0.5 ML: 10 INJECTION, SOLUTION INFILTRATION; PERINEURAL at 07:29

## 2019-03-18 RX ADMIN — FENTANYL CITRATE 25 MCG: 50 INJECTION, SOLUTION INTRAMUSCULAR; INTRAVENOUS at 13:00

## 2019-03-18 RX ADMIN — CELECOXIB 200 MG: 200 CAPSULE ORAL at 07:29

## 2019-03-18 ASSESSMENT — COGNITIVE AND FUNCTIONAL STATUS - GENERAL
SUGGESTED CMS G CODE MODIFIER DAILY ACTIVITY: CJ
DRESSING REGULAR UPPER BODY CLOTHING: A LITTLE
MOBILITY SCORE: 18
MOVING TO AND FROM BED TO CHAIR: A LITTLE
STANDING UP FROM CHAIR USING ARMS: A LITTLE
MOVING FROM LYING ON BACK TO SITTING ON SIDE OF FLAT BED: A LITTLE
WALKING IN HOSPITAL ROOM: A LITTLE
CLIMB 3 TO 5 STEPS WITH RAILING: A LITTLE
TOILETING: A LITTLE
HELP NEEDED FOR BATHING: A LITTLE
TURNING FROM BACK TO SIDE WHILE IN FLAT BAD: A LITTLE
DRESSING REGULAR LOWER BODY CLOTHING: A LITTLE
DAILY ACTIVITIY SCORE: 20
SUGGESTED CMS G CODE MODIFIER MOBILITY: CK

## 2019-03-18 ASSESSMENT — COPD QUESTIONNAIRES
HAVE YOU SMOKED AT LEAST 100 CIGARETTES IN YOUR ENTIRE LIFE: YES
COPD SCREENING SCORE: 4
DURING THE PAST 4 WEEKS HOW MUCH DID YOU FEEL SHORT OF BREATH: NONE/LITTLE OF THE TIME
DO YOU EVER COUGH UP ANY MUCUS OR PHLEGM?: NO/ONLY WITH OCCASIONAL COLDS OR INFECTIONS

## 2019-03-18 ASSESSMENT — LIFESTYLE VARIABLES
EVER_SMOKED: YES
EVER_SMOKED: YES
ALCOHOL_USE: NO

## 2019-03-18 ASSESSMENT — PATIENT HEALTH QUESTIONNAIRE - PHQ9
1. LITTLE INTEREST OR PLEASURE IN DOING THINGS: NOT AT ALL
2. FEELING DOWN, DEPRESSED, IRRITABLE, OR HOPELESS: NOT AT ALL
SUM OF ALL RESPONSES TO PHQ9 QUESTIONS 1 AND 2: 0

## 2019-03-18 ASSESSMENT — PAIN SCALES - GENERAL: PAIN_LEVEL: 6

## 2019-03-18 NOTE — ANESTHESIA TIME REPORT
Anesthesia Start and Stop Event Times     Date Time Event    3/18/2019 0918 Anesthesia Start     1247 Anesthesia Stop        Responsible Staff  03/18/19    Name Role Begin End    Cuong Rich M.D. Anesth 0918 1247        Post op Diagnosis  Arthritis of knee    Premium Reason  Non-Premium      Exception Times    Start Time             End Time                    Dr. Kaveh Dixon

## 2019-03-18 NOTE — ANESTHESIA PREPROCEDURE EVALUATION
58 year old female with left knee arthritis and pain. History of problematic knee replacements and infections.     Relevant Problems   No relevant active problems       Physical Exam    Airway   Mallampati: II  TM distance: >3 FB  Neck ROM: full       Cardiovascular - normal exam  Rhythm: regular  Rate: normal  (-) murmur     Dental - normal exam         Pulmonary - normal exam  Breath sounds clear to auscultation     Abdominal    Neurological - normal exam                 Anesthesia Plan    ASA 2       Plan - general and peripheral nerve block     Peripheral nerve block will be post-op pain control  Airway plan will be LMA        Induction: intravenous    Postoperative Plan: Postoperative administration of opioids is intended.    Pertinent diagnostic labs and testing reviewed    Informed Consent:    Anesthetic plan and risks discussed with patient.

## 2019-03-18 NOTE — OR NURSING
1242 To PACU from OR via bed. Pt awake, drowsy, respirations spontaneous and non-labored. Clean surgical dressing to left knee, Prevena present. Toes to affected extremity are pink and warm, brisk cap refill observed, pedal pulse palpable.   1250 Pt sleeping, rouses suddenly complaining of intense pain. Pt falls back to sleep. When pt awakens again and complains of pain, IV medication given. Pt denies nausea.  1300 Pt sleeping, snoring, rouses suddenly complaining of pain. Pt falls back to sleep. When pt awakens again and complains of pain, IV medication given. Pt denies nausea.  1308 Report to TREY Coleman.

## 2019-03-18 NOTE — OR NURSING
"1430- rounded with pt, pt states does not want to go to the floor, c/o pain 10/10 despite RN's administration of pain meds.  Pt states \"I don't want more pain medication, I want something to quiet the knee.\" Pt anxious.  Explained to pt waiting for anesthesiologist to finish in OR and come and assess her.  Pt verbalizes understanding.  Explained to pt she can stay in PACU if she is more comfortable with staying rather than going to the floor and the plan was to send her to her room to be more comfortable while waiting for Dr Rich to see her.  Tried to reposition LLE with pillow under calf for comfort.  Pt verbalizes understanding of new plan to keep her in PACU to be seen by Dr Rich. Pt appears more calm. Tolerating PO ginger ale.  "

## 2019-03-18 NOTE — ANESTHESIA QCDR
2019 [facility] Clinical Data Registry (for Quality Improvement)     Postoperative nausea/vomiting risk protocol (Adult = 18 yrs and Pediatric 3-17 yrs)- (430 and 463)  General inhalation anesthetic (NOT TIVA) with PONV risk factors: Yes  Provision of anti-emetic therapy with at least 2 different classes of agents: Yes   Patient DID NOT receive anti-emetic therapy and reason is documented in Medical Record:  N/A    Multimodal Pain Management- (AQI59)  Patient undergoing Elective Surgery (i.e. Outpatient, or ASC, or Prescheduled Surgery prior to Hospital Admission): Yes  Use of Multimodal Pain Management, two or more drugs and/or interventions, NOT including systemic opioids: Yes   Exception: Documented allergy to multiple classes of analgesics:  N/A    PACU assessment of acute postoperative pain prior to Anesthesia Care End- Applies to Patients Age = 18- (ABG7)  Initial PACU pain score is which of the following: < 7/10  Patient unable to report pain score: N/A    Post-anesthetic transfer of care checklist/protocol to PACU/ICU- (426 and 427)  Upon conclusion of case, patient transferred to which of the following locations: PACU/Non-ICU  Use of transfer checklist/protocol: Yes  Exclusion: Service Performed in Patient Hospital Room (and thus did not require transfer): N/A    PACU Reintubation- (AQI31)  General anesthesia requiring endotracheal intubation (ETT) along with subsequent extubation in OR or PACU: No  Required reintubation in the PACU: N/A  Extubation was a planned trial documented in the medical record prior to removal of the original airway device: N/A    Unplanned admission to ICU related to anesthesia service up through end of PACU care- (MD51)  Unplanned admission to ICU (not initially anticipated at anesthesia start time): No

## 2019-03-18 NOTE — OP REPORT
DATE OF SERVICE:  03/18/2019    PREOPERATIVE DIAGNOSIS:  Arthrofibrosis, revision left total knee   arthroplasty.    POSTOPERATIVE DIAGNOSIS:  Arthrofibrosis, revision left total knee   arthroplasty.    PROCEDURE:  Revision left total knee arthroplasty.    SURGEON:  Ruiz Moreno MD    ASSISTANT:  Epi Baltazar MD    ANESTHESIA:  General with peripheral nerve block.    COMPLICATIONS:  None.    INDICATIONS:  The patient is a 58-year-old woman who had undergone prior   revision left total knee arthroplasty for metal allergy and arthrofibrosis, as   well as subsequent arthroscopic treatments for arthrofibrosis without   improvement in motion despite appropriate pain management and physical   therapy, and the patient was admitted for elective revision left total knee   arthroplasty in an effort to improve knee motion.    DESCRIPTION OF PROCEDURE:  After induction of satisfactory anesthesia, the   patient was positioned supine on the operating room table and a tourniquet   applied to the proximal left thigh.  The left lower extremity was prepped and   draped in the usual sterile fashion.  Intravenous antibiotics were   administered, and after the usual prep and drape, the limb was exsanguinated   and tourniquet inflated to 250 mm.  An anterior incision was made through the   prior anterior knee scar, which was carried down through skin and subcutaneous   tissue to the level of the extensor mechanism where full thickness skin and   subcutaneous tissue flaps were raised medially and laterally, and a medial   parapatellar arthrotomy performed.  The synovium was found to be   arthrofibrotic and thickened, but there was no purulent fluid or gross   evidence of infection seen.  A small amount of yellowish fluid was obtained   within the knee joint, but no purulent or thickened fluid was identified.  A   synovectomy of the suprapatellar pouch and medial and lateral gutters was   performed and the proximal medial soft  tissue sleeve elevated from the tibia   to the mid coronal plane which allowed partial subluxation of the extensor   mechanism.  This permitted removal of retropatellar adhesions to the point   where the knee could be flexed and the insert removed from the tibial   baseplate.  After removal of the insert, there was greater access to the   lateral gutter, which was debrided of thick scar tissue as well as the   suprapatellar pouch and medial gutter and then the knee could be flexed to 90   degrees to allow exposure of the distal femur.  The distal femur was divided   at the implant cement interface circumferentially medially and laterally with   a thin oscillating saw blade and curved osteotomes.  An effort was made to   extract the component, but this also required removal of some of the   intramedullary cement, which was accessed through the anterior femoral flange   and then the component could be removed distally.  This left the cement in the   intramedullary portion which was removed with a high speed bur and backhoe   curette instruments.  The intramedullary canal was curetted in this fashion   and this preserved the medial and lateral condylar surfaces as well as the   intact medial and lateral epicondyles and collateral ligaments, which were   preserved and protected throughout the procedure.  Although there was bone   loss anteriorly, the condylar areas were preserved suitable for fixation with   the stemmed component and also with an intramedullary defect large enough to   accommodate a femoral cone.  Scar tissue was then removed from the posterior   compartment and this allowed subluxation of the tibia anteriorly where the   baseplate was removed by dividing the implant cement interface   circumferentially medially and laterally with a thin oscillating saw blade and   curved osteotomes followed by accessing the intramedullary canal through the   anterior portion of the baseplate and posterior notch  portion.  Following   this, the component was extracted proximally and again similar to the femur,   the intramedullary cement was retained which was then removed with a high   speed bur and backhoe curettes.  Attention was then directed toward careful   examination of the patellar component, which was found to be in good position   and well fixed, although there was hypertrophic bone along the superior,   inferior, and lateral portions and to a lesser extent the medial portions of   the patella potentially contributing to the limited motion and this was   thoroughly debrided to a smooth contour tangent to the articular surface of   the patellar polyethylene  utilizing a thin oscillating saw blade and   extracting the heterotopic bone surrounding the patella in this fashion along   with scar tissue along the deep portion of the extensor mechanism.  The tibia   was then reamed to an 11 mm diameter and femur to a 12 mm diameter, which   engaged the endosteal cortex of both bones and the tibial root reamer was then   used as an alignment guide for making the tibial cut where the revision   Legion cutting block was placed over the proximal end of the reamer and pinned   in place to make a minimal resection, 1-2 mm of the remaining peripheral bone   stock, which was preserved.  The resection was made with an oscillating saw   and the resected bone surface removed.  The tibia was then also prepared for a   tibial cone, utilizing the [employer] reaming and broach system up to a   22 mm short cone, which fit very well in the upper portion of the tibia with   an excellent press fit, allowing placement of the tibial baseplate and stem   through the center portion of the cone, thus permitting cementless fixation to   the cone with cemented fixation along the intramedullary stem optimizing   long-term durability of the implant.  The tibia was sized for a Journey size 3   baseplate and the revision baseplate connected to  an 11 mm diameter x 160 mm   length stem, which was placed and the alignment checked and found to be   suitable with good coverage of the remaining bone stock.  Attention was then   directed toward preparation of the femur where the tibial trial was removed   and the femur prepared for a distal resection utilizing the 12 mm reamer as an   alignment jig and placing the revision cutting block over the distal end of   this to make a minimal resection.  The cut was made on the medial and lateral   condylar surfaces and the resected bone removed.  This allowed careful   identification of the epicondylar axis, which was used for rotational   orientation of the implant and a size 4 Legion femoral component selected with   10 mm distal augments, and 4 mm offset to accommodate the position of the   intramedullary stem.  The cutting block was pinned in place and the anterior   and posterior cuts made for a 5 mm posterolateral wedge and the dual offset   reamer system used to accommodate the offset following the 's   guidelines with a 2 reaming technique.  The femur was then prepared for   implantation of the cone utilizing the reamer and broaching system over the   intramedullary stem and broaching up to a 22 mm cone for the femoral side,   which fit well.  The femoral cone trial was placed and an excellent press fit   obtained with good fit against viable appearing host bone, allowing cementless   fixation of the cone and cemented intramedullary fixation and then a femoral   implant selected for the trial consisting of a Legion size 4 femoral revision   trial with 10 mm distal medial and a 10 mm distal and 5 mm posterior lateral L   wedge, 4 mm offset directed posteriorly, and 11 mm diameter x 120 mm length   stem.  The trial was placed, as well as a posterior stabilized box housing.    The knee brought into extension with a trial insert, which allowed full   extension of the knee as well as flexion with good  overall soft tissue balance   and alignment and patellar tracking throughout the arc of motion and no   impingement on the patella and the polyethylene insert or post in deep flexion   including flexion to 125 degrees.  The tibial rotation was noted as well as   the femoral position and the trial components were removed.  The tourniquet   was then released and knee joint surfaces thoroughly irrigated with pulsatile   lavage and adequate hemostasis maintained with electrocautery.  The tibial   cone, short 22 mm was then inserted and fully impacted into position in the   same position as the trial component with an excellent press fit and the 22 mm   femoral cone also impacted in position in the same orientation as the trial   component with an excellent press fit.  The wound which was fully irrigated   with pulsatile lavage and adequate control of bleeding obtained so that the   components could be cemented with the knee flexed.  Three packs of cement were   mixed after assembling the tibial and femoral components.  The tibial   component consisted of a Legion revision size 3 baseplate with the 11 mm   diameter x 160 mm length stem extension, and the femoral component consisted   of a Legion Oxinium size 4 with 10 mm distal medial wedge, 10 mm distal and 5   mm posterior lateral wedge, 4 mm offset directed posteriorly, and 11 mm   diameter x 120 mm length stem.  All the modular connections were assembled   with appropriate impaction forces and set screws following the 's   guidelines and then cement was applied to the undersurface of the tibial   component and stem, which was cemented through the center of the cone and   fully seated into position in the same orientation as the trial component   where the center of the tibial baseplate was oriented along the medial third   of the tibial tubercle.  The femoral component was similarly cemented in place   and fully seated in the same orientation as the trial  component and excess   cement removed from the edges of the implant.  Once the cement had set, the   knee was checked with a series of trial inserts and the 11 mm insert selected.    The non-constrained insert was used to assess soft tissue balance with good   overall limb alignment balance to varus and valgus stress in extension, mid   flexion, and full flexion and then the trial insert removed and a   semi-constrained 11 mm insert selected for the prosthetic component, which was   carefully locked into the tibial baseplate locking mechanism.  This was   checked manually and visually to ensure that the liner locking mechanism had   been properly engaged and the knee reduced and checked through full range of   motion which indicated good overall limb alignment checked from the hip to   ankle with motion from 0-125 degrees and excellent stability to varus and   valgus and AP stress and extension, mid flexion, and full flexion with central   patellar tracking throughout the arc of motion and no impingement of the   patella or extensor mechanism on the tibial component or insert or post.  The   wound was irrigated with dilute Betadine followed by pulsatile saline lavage   and then the [employer] membrane placed along the superior flange of the   femoral component to minimize likelihood of adherence of the extensor   mechanism to the distal femur.  The synovium was elevated medially and   laterally that remained in the anterior distal femur allowing securing the   membrane.  The membrane was gently placed and a #1 Vicryl suture was used to   secure the proximal portion of the membrane in the suprapatellar pouch just   proximal to the proximal flange of the femoral component.  The medial   retinacular arthrotomy was then closed with a running #1 Quill suture and   reinforced with a 4-6 #1 Vicryl sutures and the knee fully flexed to ensure   the integrity of the retinacular repair.  The intra-articular arthrotomy was    then injected with the second [employer] liquid component to minimize   arthrofibrosis and then the subcutaneous layer closed with interrupted 0 and   2-0 Vicryl sutures followed by closure of the skin with a 3-0 Monocryl suture   and skin glue.  A sterile dressing, Prevena was applied.  The patient   tolerated the procedure well and returned to recovery room in stable   condition.       ____________________________________     MD SARINA Sainz / LIVIA    DD:  03/18/2019 12:42:21  DT:  03/18/2019 13:40:54    D#:  [id number]  Job#:  [id number]

## 2019-03-18 NOTE — ANESTHESIA PROCEDURE NOTES
Peripheral Block  Performed by: MANI LEDBETTER  Authorized by: MANI LEDBETTER     Patient Location:  Pre-op  Start Time:  3/18/2019 8:48 AM  End Time:  3/18/2019 8:51 AM  Reason for Block: at surgeon's request and post-op pain management    patient identified, IV checked, site marked, risks and benefits discussed, surgical consent, monitors and equipment checked, pre-op evaluation and timeout performed    Patient Position:  Supine  Prep: ChloraPrep    Monitoring:  Heart rate, continuous pulse ox and cardiac monitor  Block Region:  Lower Extremity  Lower Extremity - Block Type:  Adductor canal block  Laterality:  Left  Procedures: ultrasound guided  Image captured, interpreted and electronically stored.  Local Infiltration:  Lidocaine  Strength:  1  Dose:  3  Block Type:  Single-shot  Needle Length:  100mm  Needle Gauge:  21 G  Needle Localization:  Ultrasound guidance  Injection Assessment:  Negative aspiration for heme, no paresthesia on injection, incremental injection and local visualized surrounding nerve on ultrasound   US Guided Selective Femoral Nerve Block at Adductor Canal:   US probe placed at distal-thigh level on externally rotated leg and femur identified.  Probe directed medially until Sartorius Muscle (SM), Femoral Artery (FA) and Saphenous Nerve (SN) identified in Adductor Canal (AC).  Needle inserted anterolateral to probe in an in plane approach into a subsartorial perivascular perineural position.  After negative aspiration LA injected with ease and visualized spreading within the AC.

## 2019-03-18 NOTE — PROGRESS NOTES
"Progress Note  S: Doing overall, pain reasonably well controlled  O:     Vitals:    03/04/19 0854 03/18/19 0721 03/18/19 1242   Pulse:  69    Resp:  16 16   Temp:  36.3 °C (97.3 °F) 36.7 °C (98.1 °F)   TempSrc:  Temporal Temporal   SpO2:  96% 92%   Weight: 87.6 kg (193 lb 2 oz) 85.9 kg (189 lb 6 oz)    Height: 1.702 m (5' 7\")           Generall well appearing and in NAD  LLE: Prevena in place appears CDI, holding suction, wiggles toes and fires APF/ADF.  Endorses sensation intact to light touch throughout foot, toes are warm and pink, palpable DP pulse.    Imaging: post op imaging pending    Labs:     No results for input(s): WBC, RBC, HEMOGLOBIN, HEMATOCRIT, MCV, MCH, RDW, PLATELETCT, MPV, NEUTSPOLYS, LYMPHOCYTES, MONOCYTES, EOSINOPHILS, BASOPHILS, RBCMORPHOLO in the last 72 hours.    Lab Results   Component Value Date/Time    SODIUM 139 03/04/2019 10:00 AM    POTASSIUM 4.5 03/04/2019 10:00 AM    CHLORIDE 104 03/04/2019 10:00 AM    CO2 26 03/04/2019 10:00 AM    GLUCOSE 88 03/04/2019 10:00 AM    BUN 19 03/04/2019 10:00 AM    CREATININE 0.74 03/04/2019 10:00 AM          Plan  58 y.o.  female  s/p revision left TKA   - Weight bearing status: WBAT   - Restrictions: None  - DVT ppx: Xarelto 10mg PO daily  - labs: H&H POD 1  - pain control, bowel regimen  - PT/OT  - Abx: Daptomycin IV while in house.  Transition to Levaquin PO at discharge (already has Rx)  - Dispo: Home POD 1-2    Epi Baltazar MD  [facility]       "

## 2019-03-18 NOTE — ANESTHESIA POSTPROCEDURE EVALUATION
Patient: Lesley Hernandez    Procedure Summary     Date:  03/18/19 Room / Location:   OR  / SURGERY [facility]    Anesthesia Start:  0918 Anesthesia Stop:  1247    Procedure:  KNEE REVISION TOTAL (Left Knee) Diagnosis:       Knee pain, left      History of arthroplasty of left knee      (PAIN DUE TO INTERNAL ORTHOPEDIC DEVICES, MARCIAL OF KNEE REPLACEMENT LEFT)    Surgeon:  Ruiz Moreno M.D. Responsible Provider:  Cuong Rich M.D.    Anesthesia Type:  general, peripheral nerve block ASA Status:  2          Final Anesthesia Type: general, peripheral nerve block  Last vitals  BP   NIBP: 110/64 (03/18/19 1242)    Temp   36.7 °C (98.1 °F) (03/18/19 1242)    Pulse   Heart Rate (Monitored): 84 (03/18/19 1242)   Resp   16 (03/18/19 1242)    SpO2   92 % (03/18/19 1242)      Anesthesia Post Evaluation    Patient location during evaluation: PACU  Patient participation: complete - patient participated  Level of consciousness: awake and alert  Pain score: 6    Airway patency: patent  Anesthetic complications: no  Cardiovascular status: hemodynamically stable  Respiratory status: acceptable  Hydration status: euvolemic    PONV: none           Pain Rating Scale (NPRS): 0 - No Pain

## 2019-03-18 NOTE — OR NURSING
"1308: Report from TREY Yoder, patient switing from snorring to stating she is in extreme pain.  1315: Dr. Rich at bedside, gave verbal for 1mg versed to help calm patient, patient states she is still in extreme pain.    1320: Patient received dose of versed, sleeping, O2 at 95% on 2lpm.    1325: Patient sleeping, no signs of pain or discomfort.  1340: Patient goes from snoring to stating that she is in excruciating pain.  Medication dose of IV pain medication will be given.  1455: Patient falls asleep and O2 desats to mid 80s, O2 increased to 3 lpm.    1459: Patient wakes up and ststaes she is in excruciating pain and asked if she just has to \"muscle through it.\"  I discussed with her that her anesthesiologist came and evaluated her and that the pain medications are causing her O2 to drop, which is unsafe for her.  Patient states that she has never had pain like this and that she is not sleeping but just tolerating the pain.  I discussed with her that she is snoring and that her O2 drops when she falls asleep.  I discussed with her floor nurse about possible solutions.  After complaining about pain, patient falls back asleep and is snoring.    1405:  Patient is awake and states she does not want to go to the floor as she is not comfortable leaving PACU with this level of pain.  Patient states she has never felt more pain in her life and that it is insane that she would be expected to deal with the pain she is in.   I told patient I would call anesthesia, Estela RN acting as charge nurse notified and is speaking with patient. We discussed not giving more IV pain medication as her O2 drops and she states she doesn't want any more pain medication, she wants her knee to be numb.  Patient states \"there has to be a way to make my knee stop hurting with a block or something.  I don't want pain medication, I just want it to be numb.\"  1410: Called DR. Rich and gave update.  He is in surgery but willing to come " "re-evaluate the patient once he is out of surgery.  Update given to patient.    1425: Kalani, PACU manager called and update given.  Patient resting in bed with ice to knee.  Resting with eyes closed.    1440: Patient taking sips of ginger ale.  States she can't believe she is in pain, as she was promised this knee surgery was going to be different.  She states she has had several surgeries.  1445:  Patient awake and talking, O2 staying mid nineties on 3lpm, I will give oral medications.     1500: Patient sleeping, no signs of pain or discomfort at this time.    1515: Patient opens eyes to voice, stated \"the edge\" has been taken off her pain but she is still in pain.  She states she believes the oxycodone has started working and has helped improve her pain.  Patient takes sips of ginger ale, no reported nausea.  Keeping friend posted.  1530: Dr Rich at bedside discussing expectations for pain control post surgery.    1545: Patient tearful, Estela, RN speaking with patient.  1554: Transported to room.  Mark aware of everything we have discused.        "

## 2019-03-18 NOTE — PROGRESS NOTES
Med rec completed per pt.   Antibiotics within last 30 days: No  Patient allergies have been reviewed: Yes

## 2019-03-18 NOTE — ANESTHESIA PROCEDURE NOTES
Airway  Date/Time: 3/18/2019 9:33 AM  Performed by: MANI LEDBETTER  Authorized by: MANI LEDBETTER     Location:  OR  Urgency:  Elective  Indications for Airway Management:  Anesthesia  Spontaneous Ventilation: absent    Sedation Level:  Deep  Preoxygenated: Yes    Mask Difficulty Assessment:  0 - not attempted  Final Airway Type:  Supraglottic airway  Final Supraglottic Airway:  Standard LMA  Number of Attempts at Approach:  1

## 2019-03-19 VITALS
HEIGHT: 67 IN | TEMPERATURE: 98.1 F | OXYGEN SATURATION: 100 % | BODY MASS INDEX: 29.72 KG/M2 | RESPIRATION RATE: 18 BRPM | HEART RATE: 75 BPM | DIASTOLIC BLOOD PRESSURE: 89 MMHG | WEIGHT: 189.38 LBS | SYSTOLIC BLOOD PRESSURE: 123 MMHG

## 2019-03-19 LAB
HCT VFR BLD AUTO: 36.5 % (ref 37–47)
HGB BLD-MCNC: 11.8 G/DL (ref 12–16)

## 2019-03-19 PROCEDURE — 97162 PT EVAL MOD COMPLEX 30 MIN: CPT

## 2019-03-19 PROCEDURE — 36415 COLL VENOUS BLD VENIPUNCTURE: CPT

## 2019-03-19 PROCEDURE — 97535 SELF CARE MNGMENT TRAINING: CPT

## 2019-03-19 PROCEDURE — 97165 OT EVAL LOW COMPLEX 30 MIN: CPT

## 2019-03-19 PROCEDURE — 85018 HEMOGLOBIN: CPT

## 2019-03-19 PROCEDURE — A9270 NON-COVERED ITEM OR SERVICE: HCPCS | Performed by: STUDENT IN AN ORGANIZED HEALTH CARE EDUCATION/TRAINING PROGRAM

## 2019-03-19 PROCEDURE — 85014 HEMATOCRIT: CPT

## 2019-03-19 PROCEDURE — 700111 HCHG RX REV CODE 636 W/ 250 OVERRIDE (IP): Performed by: STUDENT IN AN ORGANIZED HEALTH CARE EDUCATION/TRAINING PROGRAM

## 2019-03-19 PROCEDURE — 700102 HCHG RX REV CODE 250 W/ 637 OVERRIDE(OP): Performed by: STUDENT IN AN ORGANIZED HEALTH CARE EDUCATION/TRAINING PROGRAM

## 2019-03-19 RX ORDER — AMOXICILLIN 250 MG
1 CAPSULE ORAL
Qty: 30 TAB | Refills: 0 | COMMUNITY
Start: 2019-03-19 | End: 2020-05-02

## 2019-03-19 RX ADMIN — HYDROCODONE BITARTRATE AND ACETAMINOPHEN 1 TABLET: 10; 325 TABLET ORAL at 12:04

## 2019-03-19 RX ADMIN — KETOROLAC TROMETHAMINE 30 MG: 30 INJECTION, SOLUTION INTRAMUSCULAR; INTRAVENOUS at 12:04

## 2019-03-19 RX ADMIN — HYDROCODONE BITARTRATE AND ACETAMINOPHEN 1 TABLET: 10; 325 TABLET ORAL at 05:39

## 2019-03-19 RX ADMIN — HYDROCODONE BITARTRATE AND ACETAMINOPHEN 1 TABLET: 10; 325 TABLET ORAL at 09:36

## 2019-03-19 RX ADMIN — KETOROLAC TROMETHAMINE 30 MG: 30 INJECTION, SOLUTION INTRAMUSCULAR; INTRAVENOUS at 05:38

## 2019-03-19 RX ADMIN — DEXAMETHASONE SODIUM PHOSPHATE 10 MG: 4 INJECTION, SOLUTION INTRA-ARTICULAR; INTRALESIONAL; INTRAMUSCULAR; INTRAVENOUS; SOFT TISSUE at 06:37

## 2019-03-19 RX ADMIN — HYDROCODONE BITARTRATE AND ACETAMINOPHEN 1 TABLET: 10; 325 TABLET ORAL at 03:34

## 2019-03-19 ASSESSMENT — GAIT ASSESSMENTS
DISTANCE (FEET): 200
ASSISTIVE DEVICE: FRONT WHEEL WALKER
DEVIATION: ANTALGIC;STEP TO
GAIT LEVEL OF ASSIST: STAND BY ASSIST

## 2019-03-19 ASSESSMENT — ACTIVITIES OF DAILY LIVING (ADL): TOILETING: INDEPENDENT

## 2019-03-19 NOTE — PROGRESS NOTES
Pt continuing to refuse to ambulate d/t pain but denies need for additional dilaudid. Pt also refuses to use commode. Pt utilized bedpan up until this point. Educated pt on the importance of being as ambulatory as possible. Pt continues to deny at this time

## 2019-03-19 NOTE — PROGRESS NOTES
Pt discharged to home via wheelchair escorted with CNA.  All discharge instructions given, questions and concerns addressed.   All prescriptions given pre op  PIV removed without complications.  Follow up appt discussed.   PREVENA instructions given.  All belongings with pt.

## 2019-03-19 NOTE — PROGRESS NOTES
Report received from Mark YANG. Pt awake, alert, appropriate, and pleasant. Pt reports continued pain but 'nothing like before'. Medicated with PRN norco at this time. Pt denies further needs, dressing CDI. Will continue to monitor.

## 2019-03-19 NOTE — PROGRESS NOTES
CPM has been reapplied to pt and increased by 5 degrees from 75 to 80. Pt seem comfortable and happy with adjustment.

## 2019-03-19 NOTE — PROGRESS NOTES
Dr. Moreno called to get clarification orders for CPM. Awaiting return page from Annamaria Parmar.

## 2019-03-19 NOTE — PROGRESS NOTES
Pt ambulated with FWW to bathroom and back. Denies dizziness, reports pain is better when ambulating. Tolerated well.

## 2019-03-19 NOTE — PROGRESS NOTES
Received report from Lorraine/TREY.  Assumed patient care.  Patient is A&Ox4, resting comfortably in chair.  Patient on RA.  No signs of SOB/respiratory distress.  Reports 7/10 pain, CPM in place.  Assessment completed, labs noted, VSS.  Surgical dressing to L knee in place CDI, +CMS, + pulse, able to wiggle toes and dorsi/plantar flex.  Polar ice & SCDs on.  Educated patient regarding the importance to call for assistance. Fall precautions in place. Bed locked & at lowest position. Call light and personal belongings within reach. Continue to monitor.

## 2019-03-19 NOTE — PROGRESS NOTES
CPM was removed at 0800 and will need to be reapplied at 1000. For any other Orthopedic assistance please don't hesitate to call the traction team @ [phone]

## 2019-03-19 NOTE — PROGRESS NOTES
"Progress Note  S: Doing overall, pain reasonably well controlled this morning.  Wishes to DC home later today.  No new complaints.  O:     Vitals:    03/18/19 1906 03/19/19 0000 03/19/19 0339 03/19/19 0810   BP: 127/78 129/83 101/59 123/89   Pulse: 73 77 60 75   Resp: 18 18 18 18   Temp: 36.2 °C (97.2 °F) 36.6 °C (97.9 °F) 36.5 °C (97.7 °F) 36.7 °C (98.1 °F)   TempSrc: Oral Oral Oral Oral   SpO2: 98% 98% 98% 100%   Weight:       Height: 1.702 m (5' 7.01\")            Generall well appearing and in NAD  LLE: Prevena in place appears CDI, holding suction, wiggles toes and fires APF/ADF.  Endorses sensation intact to light touch throughout foot, toes are warm and pink, palpable DP pulse.    Imaging: post op imaging reviewed    Labs:     Recent Labs      03/19/19   0502   HEMOGLOBIN  11.8*   HEMATOCRIT  36.5*       Lab Results   Component Value Date/Time    SODIUM 139 03/04/2019 10:00 AM    POTASSIUM 4.5 03/04/2019 10:00 AM    CHLORIDE 104 03/04/2019 10:00 AM    CO2 26 03/04/2019 10:00 AM    GLUCOSE 88 03/04/2019 10:00 AM    BUN 19 03/04/2019 10:00 AM    CREATININE 0.74 03/04/2019 10:00 AM          Plan  58 y.o.  female  s/p revision left TKA   - Weight bearing status: WBAT   - Restrictions: None  - DVT ppx: Xarelto 10mg PO daily  - labs: H&H POD 1  - pain control, bowel regimen  - PT/OT  - Abx: Daptomycin IV for 1st 24 hours post op then transition to Levaquin PO (already has Rx  For home)  - Dispo: Home later today    Epi Baltazar MD  [facility]  Cell: [phone]       "

## 2019-03-19 NOTE — PROGRESS NOTES
Patient a+o x 4, denies sob/lightheadedness/numbness/tingling/dizziness/chest pain/n/v/d. 10/10 pain on arrival to unit, iv dilaudid admin with good effect - patient sleeping on reassessment. Patient made an attempt to get oob and sit on edge. Unable to bend knee 2/2 10/10 pain - norco admin x 1 - wctm.+nausea x 2 this shift, iv zofran admin with good effect and scop patch placed behind left ear.  Voided post surgery.Tolerating diet. Fall precautions/hourly rounding maintained, call light within reach and functioning, all items within reach.  Patient encouraged to call for assistance, poc reviewed with patient, ?'s/concerns answered.

## 2019-03-19 NOTE — CARE PLAN
Problem: Communication  Goal: The ability to communicate needs accurately and effectively will improve  Outcome: PROGRESSING AS EXPECTED    Intervention: [place] patient and significant other/support system to call light to alert staff of needs  Patient oriented to surroundings and unit policies/routines.  Educated and understands the use of the call button.  Patient calls appropriately.      Problem: Infection  Goal: Will remain free from infection  Outcome: PROGRESSING AS EXPECTED    Intervention: Implement standard precautions and perform hand washing before and after patient contact  Patient educated and understands the importance of proper hand hygiene for herself, her visitors, and her care team.

## 2019-03-19 NOTE — THERAPY
"Physical Therapy Evaluation completed.   Bed Mobility:  Supine to Sit: Modified Independent  Transfers: Sit to Stand: Stand by Assist  Gait: Level Of Assist: Stand by Assist with Front-Wheel Walker  X 200 feet     Plan of Care: Patient with no further skilled PT needs in the acute care setting at this time  Discharge Recommendations: Equipment: No Equipment Needed. Post-acute therapy Discharge to home with outpatient or home health for additional skilled therapy services.  58 year old female S/P 3rd revision of L TKR.Pt lives at home with her son and is usually active.Pt was safe with bed mob,transfers.ambulation and stairs.She understands HEP and has no equipment needs  See \"Rehab Therapy-Acute\" Patient Summary Report for complete documentation.     "

## 2019-03-19 NOTE — DISCHARGE INSTRUCTIONS
PREVENA™ Incision Management System  You have been discharged to home with the PREVENA™ Incision Management System from [employer] an [employer].  Please refer to the Patient Guide for detailed information regarding the use of this incision management system, which includes information on general use, showering with your dressing, therapy unit lights and alarms, along with a troubleshooting guide.     If your unit is battery operated, it will die after 5-7 days.  At this time you can disconnect the therapy unit tubing but LEAVE THE DRESSING IN PLACE until your follow-up appointment with your surgeon.  Tie off the tubing or cut the excess tubing off and place medical tape over the end of the tubing.  If your nurse provided you with an additional Tegaderm dressing you can place this over your existing dressing and tubing to keep it neatly in place, however this isn't necessary.     If you have any questions regarding your Prevena™ dressing and therapy unit please contact a [facility] representative directly at: [phone]    If you are unable to reach [facility] or have other questions regarding your care, please call your surgeon's office.           Discharge Instructions    Discharged to home by car with relative. Discharged via wheelchair, hospital escort: Yes.  Special equipment needed: Not Applicable    Be sure to schedule a follow-up appointment with your primary care doctor or any specialists as instructed.     Weight bearing and range of motion as tolerated   Rixaroxiban 10mg daily for DVT prophylaxis   Leave dressing in place until follow up   Follow up in 1-2 weeks as already scheduled    LAST Galvin 1 tab given 3/19 at 940am.     Discharge Plan:   Diet Plan: Discussed  Activity Level: Discussed  Confirmed Follow up Appointment: Patient to Call and Schedule Appointment  Confirmed Symptoms Management: Discussed  Medication Reconciliation Updated: Yes  Influenza Vaccine Indication: Not indicated: Previously  immunized this influenza season and > 8 years of age    I understand that a diet low in cholesterol, fat, and sodium is recommended for good health. Unless I have been given specific instructions below for another diet, I accept this instruction as my diet prescription.   Other diet: Regular    Special Instructions: Discharge instructions for the Orthopedic Patient    Follow up with Primary Care Physician within 2 weeks of discharge to home, regarding:  Review of medications and diagnostic testing.  Surveillance for medical complications.  Workup and treatment of osteoporosis, if appropriate.     -Is this a Joint Replacement patient? Yes Total Joint Knee Replacement Discharge Instructions    Pain  - The goal is to slowly wean off the prescription pain medicine.  - Ice can be used for pain control.  20 minutes at a time is recommended, and never directly against your skin or incision.  - Most patients are off the pain pills by 3 weeks; others may require a low level of pain medications for many months.  If your pain continues to be severe, follow up with your physician.  Infection    Knee joint infections; occur in fewer than 2% of patients. The most common causes of infection following total knee replacement surgery are from bacteria that enter the bloodstream during dental procedures, urinary tract infections, or skin infections. These bacteria can lodge around your knee replacement and cause an infection.  - Keep the incision as clean and dry as possible.  - Always wash your hands before touching your incision.  - Skin infections tend to develop around 7-10 days after surgery; most can be treated with oral antibiotics.  - Dental Care should be delayed for 3 months after surgery, your surgeon recommends taking a dose of antibiotics 1 hour prior to any dental procedure. After 2 years, most surgeons recommend antibiotics only before an extensive procedure.  Ask your surgeon what he recommends.  - Signs and symptoms  of infection can include:  low grade fever, redness, pain, swelling and drainage from your incision.  Notify your surgeon immediately if you develop any of these symptoms.  Other instructions  - Bowel habits - constipation is extremely common and is caused by a combination of anesthesia, lack of mobility and pain medicine.  Use stool softeners or laxatives if necessary. It is important not to ignore this problem, as bowel obstructions can be a serious complication after joint replacement surgery.  - Mood/Energy Level - Many patients experience a lack of energy and endurance for up to 2-3 months after surgery.  Some may also feel down and can even become depressed.  This is likely due to the postoperative anemia, change in activity level, lack of sleep, pain medicine and just the emotional reaction to the surgery itself that is a big disruption in a person’s life.  This usually passes.  If symptoms persist, follow up with your primary physician.  - Returning to work - Your surgeon will give you more specific instructions. Depending on the type of activities you perform, it may be 6 to 8 weeks before you return to work.   Generally, if you work a sedentary job requiring little standing or walking, most patients may return within 2-6 weeks.  Manual labor jobs involving walking, lifting and standing may take longer. Your surgeon’s office can provide a release to part-time or light duty work early on in your recovery and progress you to full duty as able.    - Driving - If your left knee was replaced and you have an automatic transmission, you may be able to begin driving in a week or so, provided you are no longer taking narcotic pain medication. If your right knee was replaced, avoid driving for 6 to 8 weeks. Remember that your reflexes may not be as sharp as before your surgery. Ask your surgeon for specific instructions.   - Avoiding falls - A fall during the first few weeks after surgery can damage your new knee and  may result in a need for further surgery.   throw rugs and tack down loose carpeting.  Be aware of floor hazards such as pets, small objects or uneven surfaces.    - [place] Metal Detectors - The sensitivity of metal detectors varies and it is likely that your prosthesis will cause an alarm.  Inform the  of your artificial joint.  Diet  - Resume your normal diet as tolerated.  - It is important to achieve a healthy nutritional status by eating a well balanced diet on a regular basis.  - Your physician may recommend that you take iron and vitamin supplements.   - Continue to drink plenty of fluids.  Shower/Bathing  - You may shower as soon as you get home from the hospital unless otherwise instructed.  - Keep your incision out of water.  To keep the incision dry when showering, cover it with a plastic bag or plastic wrap.  - Pat incision dry if it gets wet.  Don’t rub.  - Do not submerge in a bath until staples are out and the incision is completely healed. (Approximately 6-8 weeks)  Dressing Change:  Procedure (if recommended by your physician)  - Wash hands.  - Open all new dressing change materials.  - Remove old dressing and discard.  - Inspect incision for redness, increase in clear drainage, yellow/green drainage, odor and surrounding skin hot to touch.  -  ABD (large gauze) pad or “island dressing” by one corner and lay over the incision.  Be careful not to touch the inside of the dressing that will lay over the incision.  - Secure in place as instructed (Ace wrap or tape).    Swelling/Bruising    - Swelling can last from 3-6 months.  - Elevate your leg higher than your heart while reclining.   The first week you are home you should elevate your leg an equal amount of time, as you are active.    - Anti-inflammatory pills can be taken once you have stopped the blood thinners.  - The swelling is usually worse after you go home since you are upright for longer periods of  time.  - Bruising is common and can involve the entire leg including the thigh, calf and even foot. Bruising often does not appear until after you arrive home and it can be quite dramatic- purple, black, and green.  The bruising you can see is not usually concerning and will subside without any treatment.      Blood Clot Prevention  Blood clots in the legs and the less common, but frightening, clots that travel to the lungs are a real focus of our preventative. Most patients are at standard risk for them, but those patients who are at higher risk include people who have had previous clots, a family history of clotting, smoking, diabetes, obesity, advanced age, use of estrogen and a sedentary lifestyle.    - Signs of blood clots in legs - Swelling in thigh, calf or ankle that does not go down with elevation.  Pain, heat and tenderness in calf, back of calf or groin area.  NOTE: blood clots can occur in either leg.  - You have been receiving anticoagulant therapy (blood thinners) in the hospital and you may be instructed to continue at home depending on your risk factors.  - Your risk for developing a clot continues for up to 2-3 months after surgery.  You should avoid prolonged sitting and dehydration during that time (long air trips and car trips).  If you do take a trip during this time, please get up and move around every 1- 1.5 hours.  - If you are prescribed blood-thinning medication for home, follow instructions as directed. (Handouts provided if applicable).      Activity  Once home, you should continue to stay active. The key is to remember not to overdo it! While you can expect some good days and some bad days, you should notice a gradual improvement and a gradual increase in your endurance over the next 6 to 12 months. Exercise is a critical component of home care, particularly during the first few weeks after surgery.     - Normal activities of daily living You should be able to resume most within 3 to 6  weeks following surgery. Some pain with activity and at night is common for several weeks after surgery  -  Physical Therapy Exercises - Continue to do the exercises prescribed for at least two months after surgery. Riding a stationary bicycle can help maintain muscle tone and keep your knee flexible. Try to achieve the maximum degree of bending and extension possible. (handout provided by Therapist).  - Sexual Activity -. Your surgeon can tell you when it safe to resume sexual activity.    - Sleeping Positions - You can safely sleep on your back, on either side, or on your stomach.   - Other Activities - Walk as much as you like, but remember that walking is no substitute for the exercises your doctor and physical therapist will prescribe. Lower impact activities are preferred.  If you have specific questions, consult your Surgeon.    When to Call the Doctor   Call the physician if:   - Fever over 100.5? F  - Increased pain, drainage, redness, odor or heat around the incision area  - Shaking chills  - Increased knee pain with activity and rest  - Increased pain in calf, tenderness or redness above or below the knee  - Increased swelling of calf, ankle, foot  - Sudden increased shortness of breath, sudden onset of chest pain, localized chest pain with coughing  - Incision opening  Or, if there are any questions or concerns about medications or care.       -Is this patient being discharged with medication to prevent blood clots?  Yes, Xarelto Rivaroxaban oral tablets  What is this medicine?  RIVAROXABAN (ri va VALDEZ a ban) is an anticoagulant (blood thinner). It is used to treat blood clots in the lungs or in the veins. It is also used after knee or hip surgeries to prevent blood clots. It is also used to lower the chance of stroke in people with a medical condition called atrial fibrillation.  This medicine may be used for other purposes; ask your health care provider or pharmacist if you have questions.  COMMON BRAND  NAME(S): Xarelto, Xarelto Starter Pack  What should I tell my health care provider before I take this medicine?  They need to know if you have any of these conditions:  -bleeding disorders  -bleeding in the brain  -blood in your stools (black or tarry stools) or if you have blood in your vomit  -history of stomach bleeding  -kidney disease  -liver disease  -low blood counts, like low white cell, platelet, or red cell counts  -recent or planned spinal or epidural procedure  -take medicines that treat or prevent blood clots  -an unusual or allergic reaction to rivaroxaban, other medicines, foods, dyes, or preservatives  -pregnant or trying to get pregnant  -breast-feeding  How should I use this medicine?  Take this medicine by mouth with a glass of water. Follow the directions on the prescription label. Take your medicine at regular intervals. Do not take it more often than directed. Do not stop taking except on your doctor's advice. Stopping this medicine may increase your risk of a blood clot. Be sure to refill your prescription before you run out of medicine.  If you are taking this medicine after hip or knee replacement surgery, take it with or without food. If you are taking this medicine for atrial fibrillation, take it with your evening meal. If you are taking this medicine to treat blood clots, take it with food at the same time each day. If you are unable to swallow your tablet, you may crush the tablet and mix it in applesauce. Then, immediately eat the applesauce. You should eat more food right after you eat the applesauce containing the crushed tablet.  Talk to your pediatrician regarding the use of this medicine in children. Special care may be needed.  Overdosage: If you think you have taken too much of this medicine contact a poison control center or emergency room at once.  NOTE: This medicine is only for you. Do not share this medicine with others.  What if I miss a dose?  If you take your medicine  once a day and miss a dose, take the missed dose as soon as you remember. If you take your medicine twice a day and miss a dose, take the missed dose immediately. In this instance, 2 tablets may be taken at the same time. The next day you should take 1 tablet twice a day as directed.  What may interact with this medicine?  Do not take this medicine with any of the following medications:  -defibrotide  This medicine may also interact with the following medications:  -aspirin and aspirin-like medicines  -certain antibiotics like erythromycin, azithromycin, and clarithromycin  -certain medicines for fungal infections like ketoconazole and itraconazole  -certain medicines for irregular heart beat like amiodarone, quinidine, dronedarone  -certain medicines for seizures like carbamazepine, phenytoin  -certain medicines that treat or prevent blood clots like warfarin, enoxaparin, and dalteparin  -conivaptan  -diltiazem  -felodipine  -indinavir  -lopinavir; ritonavir  -NSAIDS, medicines for pain and inflammation, like ibuprofen or naproxen  -ranolazine  -rifampin  -ritonavir  -SNRIs, medicines for depression, like desvenlafaxine, duloxetine, levomilnacipran, venlafaxine  -SSRIs, medicines for depression, like citalopram, escitalopram, fluoxetine, fluvoxamine, paroxetine, sertraline  -[place]'s wort  -verapamil  This list may not describe all possible interactions. Give your health care provider a list of all the medicines, herbs, non-prescription drugs, or dietary supplements you use. Also tell them if you smoke, drink alcohol, or use illegal drugs. Some items may interact with your medicine.  What should I watch for while using this medicine?  Visit your doctor or health care professional for regular checks on your progress.  Notify your doctor or health care professional and seek emergency treatment if you develop breathing problems; changes in vision; chest pain; severe, sudden headache; pain, swelling, warmth in the  leg; trouble speaking; sudden numbness or weakness of the face, arm or leg. These can be signs that your condition has gotten worse.  If you are going to have surgery or other procedure, tell your doctor that you are taking this medicine.  What side effects may I notice from receiving this medicine?  Side effects that you should report to your doctor or health care professional as soon as possible:  -allergic reactions like skin rash, itching or hives, swelling of the face, lips, or tongue  -back pain  -redness, blistering, peeling or loosening of the skin, including inside the mouth  -signs and symptoms of bleeding such as bloody or black, tarry stools; red or dark-brown urine; spitting up blood or brown material that looks like coffee grounds; red spots on the skin; unusual bruising or bleeding from the eye, gums, or nose  Side effects that usually do not require medical attention (report to your doctor or health care professional if they continue or are bothersome):  -dizziness  -muscle pain  This list may not describe all possible side effects. Call your doctor for medical advice about side effects. You may report side effects to FDA at 8-389-FDA-9141.  Where should I keep my medicine?  Keep out of the reach of children.  Store at room temperature between 15 and 30 degrees C (59 and 86 degrees F). Throw away any unused medicine after the expiration date.  NOTE: This sheet is a summary. It may not cover all possible information. If you have questions about this medicine, talk to your doctor, pharmacist, or health care provider.  © 2018 [employer]/Gold Standard (2017-09-06 16:29:33)      · Is patient discharged on Warfarin / Coumadin?   No     Depression / Suicide Risk    As you are discharged from this [facility] facility, it is important to learn how to keep safe from harming yourself.    Recognize the warning signs:  · Abrupt changes in personality, positive or negative- including increase in energy   · Giving  away possessions  · Change in eating patterns- significant weight changes-  positive or negative  · Change in sleeping patterns- unable to sleep or sleeping all the time   · Unwillingness or inability to communicate  · Depression  · Unusual sadness, discouragement and loneliness  · Talk of wanting to die  · Neglect of personal appearance   · Rebelliousness- reckless behavior  · Withdrawal from people/activities they love  · Confusion- inability to concentrate     If you or a loved one observes any of these behaviors or has concerns about self-harm, here's what you can do:  · Talk about it- your feelings and reasons for harming yourself  · Remove any means that you might use to hurt yourself (examples: pills, rope, extension cords, firearm)  · Get professional help from the community (Mental Health, Substance Abuse, psychological counseling)  · Do not be alone:Call your Safe Contact- someone whom you trust who will be there for you.  · Call your local CRISIS HOTLINE 563-6197 or 615-726-3063  · Call your local Children's Mobile Crisis Response Team [place] (254) 508-3719 or www.Astech  · Call the toll free National Suicide Prevention Hotlines   · National Suicide Prevention Lifeline 993-491-PKZS (1895)  · National Hope Line Network 800-SUICIDE (748-0936)

## 2019-03-19 NOTE — DISCHARGE SUMMARY
Lesley Hernandez was admitted on 3/18/2019. Underwent a revision left total knee arthroplasty by Dr. Moreno  on the date of admission. Please see dictated operative note for further information.    Hospital course:     The patient has done well, with no complications.  Patient denies chest pain, calf pain or shortness of breath.   Pain is well-controlled at present.  Patient is ambulating well with the use of an assistive device, and progressing in physical therapy.   Patient is neurologically and vascularly intact with palpable pedal pulses bilaterally.      Discharge date: 3/19/2019    Patient is being discharged to home after am physical therapy.     Allergies:  Amoxicillin; Pcn [penicillins]; Cephalexin; Ertapenem; Vancomycin; and Nickel       Medication List      START taking these medications      Instructions   rivaroxaban 10 MG Tabs tablet  Commonly known as:  XARELTO   Take 1 Tab by mouth every day.  Dose:  10 mg     senna-docusate 8.6-50 MG Tabs  Commonly known as:  PERICOLACE or SENOKOT S   Take 1 Tab by mouth every 24 hours as needed for Constipation.  Dose:  1 Tab        CONTINUE taking these medications      Instructions   BERBERINE COMPLEX PO   Take 1 Tab by mouth as needed.  Dose:  1 Tab     CASCARA SAGRADA PO   Take 1 Tab by mouth every day.  Dose:  1 Tab     NORCO  MG Tabs  Generic drug:  HYDROcodone/acetaminophen   Take 0.5 Tabs by mouth every four hours as needed for Severe Pain.  Dose:  0.5 Tab     PROBIOTIC ACIDOPHILUS PO   Take 1 Cap by mouth every day.  Dose:  1 Cap     VITAMIN C PO   Take 1 Tab by mouth every day.  Dose:  1 Tab            Discharge Instructions:     Patient is instructed to ambulate and weight bear as tolerated with the use of an assistive device, and to continue physical therapy exercises given during this hospital stay. No specific precautions. Patient is to ice and elevate the surgical leg regularly, with pillows under the ankle, nothing is to be placed under  the knee.   Patient was given detailed wound care instructions, and will leave the dressing on until first post-op visit.   Xarelto 10mg daily for DVT prophylaxis.  Patient is to follow up with Dr. Moreno office in 1-2 weeks.

## 2019-03-19 NOTE — CARE PLAN
Problem: Communication  Goal: The ability to communicate needs accurately and effectively will improve  Outcome: PROGRESSING AS EXPECTED  Pt calls appropriately, makes needs known to staff     Problem: Safety  Goal: Will remain free from injury  Outcome: PROGRESSING AS EXPECTED  Remains free from injury   Goal: Will remain free from falls  Outcome: PROGRESSING AS EXPECTED  Remains free from falls

## 2019-03-19 NOTE — THERAPY
"Occupational Therapy Evaluation completed.   Functional Status:  57 yo female s/p 2nd knee revision on L knee.  (3 total replacements to date).  Pt was as indep as possible previously, but limited by poor knee ROM and pain.  Pt will have assist at home from friend and from son.  Pt current able to get OOB SBA, walk in room SBA with FWW, LB dressing SBA and cues for Prevena Vac, and Supervised grooming at sink.  Pt states she has been toileting SBA since last night.  Pt reports she has all needed AE at home in shower, toilet etc, and now also has crutches in addition to walker.  Educated pt  on role of OT and Total Knee Replacement Protocol.  Reviewed application of precautions and use of Adaptive Equipment for dressing, bathing, toileting, grooming, kitchen activities and general functional mobility in the home.  Educated on covering incision/dressing with extra plastic wrap and waterproof tape to ensure that incision does not get wet, and what to do with wound Vac when bathing.  Educated on around the clock pain management strategies so that pt does not wake up in a pain crisis.  Pt verbalized understanding of all education provided. No further OT needs.     Plan of Care: Patient with no further skilled OT needs in the acute care setting at this time  Discharge Recommendations:  Equipment: No Equipment Needed. Post-acute therapy Discharge to home with outpatient or home health for additional skilled therapy services.    See \"Rehab Therapy-Acute\" Patient Summary Report for complete documentation.    "

## 2021-06-23 ENCOUNTER — HOSPITAL ENCOUNTER (OUTPATIENT)
Facility: MEDICAL CENTER | Age: 61
End: 2021-06-23
Attending: ORTHOPAEDIC SURGERY | Admitting: ORTHOPAEDIC SURGERY
Payer: COMMERCIAL

## 2021-06-23 DIAGNOSIS — M17.12 PRIMARY OSTEOARTHRITIS OF LEFT KNEE: ICD-10-CM

## 2021-06-24 PROBLEM — G89.29 CHRONIC KNEE PAIN: Status: ACTIVE | Noted: 2021-06-24

## 2021-06-24 PROBLEM — M25.569 CHRONIC KNEE PAIN: Status: ACTIVE | Noted: 2021-06-24

## 2021-07-23 ENCOUNTER — PRE-ADMISSION TESTING (OUTPATIENT)
Dept: ADMISSIONS | Facility: MEDICAL CENTER | Age: 61
End: 2021-07-23
Attending: ORTHOPAEDIC SURGERY
Payer: COMMERCIAL

## 2021-07-23 RX ORDER — VITAMIN B COMPLEX
1000 TABLET ORAL DAILY
COMMUNITY

## 2021-07-23 NOTE — PREPROCEDURE INSTRUCTIONS
"Preadmit Phone appointment: \" Preparing for your Procedure information\" Instructions discussed with Patient.   Patient instructed to continue prescribed medications through the day before surgery, instructed to take the following medications the day of surgery per anesthesia protocol:   [place] PRN.    Pt states, \"no issues with anesthesia\".  Fasting guidelines discussed with Patient,  NPO from solid food at midnight prior to surgery.  Pt encouraged to hydrate the day before surgery.with NPO from solid food at midnight prior to surgery and 3 hour cutoff for clear liquids.   Clear liquids defined.  All Pt's questions and concerns answered or addressed.  Emailed all instructions.  COVID test 8/13  "

## 2021-07-27 NOTE — H&P (VIEW-ONLY)
History of Present Illness:  Bilateral low back pain is noted.  Since last visit, there is a persistence of pain with knees aching all the time as her prominent symptom.  She is planning an arthroscopic left knee revision with Dr. Moreno with a right knee replacement to be considered afterwards. She has had limited back pain at this point compared to the knees.  Has required muscle relaxer.  Also, requiring Norco at twice a day and has continued to note need of that twice a day dosing for functionality.  No lasting numbness or weakness in the extremities.  No side effects to any medications. She still has been walking three miles a day.  Please see patient intake form, including pain diagram, for further clarification of presentation today, reviewed in detail.  No other difficulties noted otherwise.          Physical Examination   Tenderness at lumbar paraspinals today.  Straight leg raise testing in the seated position is negative bilaterally.  Lumbar range with mild forward flexion and extension deficits seen.   Neurologic examination reveals no sensory losses through the lower extremities.  Bulk and tone normal in the lower extremities.  Cognition is clear.                Impression   1. Knee pain with prior left total knee arthroplasty. Ongoing plans for left knee revision.  2. History of degenerative lumbar discs, less of pain at this point.    3. Right knee pain.     Plan   1. We have reviewed the diligence that will be needed with mechanical and medicinal approaches to help keep inflammatory processes in control, and we have discussed this in significant detail today.  Good tolerance with medicinal approaches and mechanical approaches with no side effects noted.  2. Will continue conservative care program and pain management regimen.    3. Conservative program recommended as advised and ongoing for this patient's care in this episode and future episodes.  4. She is on Klonopin and Depakote from her  psychiatrist.   5.  Norco 10-325mg refilled max 3 a day to cover for her upcoming knee surgery. As patient has required significant medications for her case for pain control, we have let patient know the significant side effects with narcotic regimen and have offered and reviewed other alternative treatment options for the patient's pain level to be treated, including anti-inflammatory approaches and other medicinal options outside of the opiate class. Narcotic benefits and risks explained to the patient today in detail, precautioned about side effects with the medications, and our recommendations for a tapering dose of medications reviewed. Narcotic reduction discussed in detail. She is pending another operation and will allow for the usual increase and later decrease with her upcoming knee revision.  10. We did review their  on today and made sure they are compliant with our prescriber protocol.  12. Last urine screen taken 10/19/20 with concordant results.   13. Ibuprofen 3x daily as anti-inflammatory. Meloxicam discontinued.  14. We will see patient back at a 1-month recheck, earlier if needed.                 I, Estela Ayala, am scribing for, and in the presence of, Ken Rodriguez MD.        IKen MD, personally performed the services described in this documentation as scribed by Estela Ayala in my presence. I do hereby attest that this information is true, accurate and complete to the best of my knowledge.

## 2021-08-13 ENCOUNTER — PRE-ADMISSION TESTING (OUTPATIENT)
Dept: ADMISSIONS | Facility: MEDICAL CENTER | Age: 61
End: 2021-08-13
Attending: ORTHOPAEDIC SURGERY
Payer: COMMERCIAL

## 2021-08-13 DIAGNOSIS — Z01.812 PRE-OPERATIVE LABORATORY EXAMINATION: ICD-10-CM

## 2021-08-13 LAB — COVID ORDER STATUS COVID19: NORMAL

## 2021-08-13 PROCEDURE — U0005 INFEC AGEN DETEC AMPLI PROBE: HCPCS

## 2021-08-13 PROCEDURE — U0003 INFECTIOUS AGENT DETECTION BY NUCLEIC ACID (DNA OR RNA); SEVERE ACUTE RESPIRATORY SYNDROME CORONAVIRUS 2 (SARS-COV-2) (CORONAVIRUS DISEASE [COVID-19]), AMPLIFIED PROBE TECHNIQUE, MAKING USE OF HIGH THROUGHPUT TECHNOLOGIES AS DESCRIBED BY CMS-2020-01-R: HCPCS

## 2021-08-14 LAB
SARS-COV-2 RNA RESP QL NAA+PROBE: NOTDETECTED
SPECIMEN SOURCE: NORMAL

## 2021-08-17 NOTE — OR NURSING
8/17 Pt called and was checking if Dr. Moreno had made an order for daptomycin to be used as her antibiotic.  Pt states she has PCN allergy and had had sepsis with previous knee surgery, and she knows she does well with daptomycin.  Spoke to pharmacy and pharmacist states the do not routinely use daptomycin for pre op.  Due to it takes special mixing and consideration to administer.  Pharmacist looked at pt's records and saw other antibiotics she tolerates.  Called Dr. Moreno's office and left message with Elisha.  I explained the situation and relayed information that pharmacist spoke of.

## 2021-08-18 ENCOUNTER — ANESTHESIA EVENT (OUTPATIENT)
Dept: SURGERY | Facility: MEDICAL CENTER | Age: 61
End: 2021-08-18
Payer: COMMERCIAL

## 2021-08-18 ENCOUNTER — HOSPITAL ENCOUNTER (OUTPATIENT)
Facility: MEDICAL CENTER | Age: 61
End: 2021-08-18
Attending: ORTHOPAEDIC SURGERY | Admitting: ORTHOPAEDIC SURGERY
Payer: COMMERCIAL

## 2021-08-18 ENCOUNTER — ANESTHESIA (OUTPATIENT)
Dept: SURGERY | Facility: MEDICAL CENTER | Age: 61
End: 2021-08-18
Payer: COMMERCIAL

## 2021-08-18 VITALS
WEIGHT: 175.27 LBS | TEMPERATURE: 97.3 F | SYSTOLIC BLOOD PRESSURE: 125 MMHG | HEART RATE: 59 BPM | BODY MASS INDEX: 27.51 KG/M2 | OXYGEN SATURATION: 91 % | HEIGHT: 67 IN | DIASTOLIC BLOOD PRESSURE: 81 MMHG | RESPIRATION RATE: 16 BRPM

## 2021-08-18 PROCEDURE — 160046 HCHG PACU - 1ST 60 MINS PHASE II: Performed by: ORTHOPAEDIC SURGERY

## 2021-08-18 PROCEDURE — 160002 HCHG RECOVERY MINUTES (STAT): Performed by: ORTHOPAEDIC SURGERY

## 2021-08-18 PROCEDURE — 700105 HCHG RX REV CODE 258: Performed by: STUDENT IN AN ORGANIZED HEALTH CARE EDUCATION/TRAINING PROGRAM

## 2021-08-18 PROCEDURE — 29884 ARTHRS KNEE SURG LYSIS ADS: CPT | Mod: LT | Performed by: ORTHOPAEDIC SURGERY

## 2021-08-18 PROCEDURE — 700111 HCHG RX REV CODE 636 W/ 250 OVERRIDE (IP): Performed by: ANESTHESIOLOGY

## 2021-08-18 PROCEDURE — 700105 HCHG RX REV CODE 258: Performed by: ORTHOPAEDIC SURGERY

## 2021-08-18 PROCEDURE — 27570 FIXATION OF KNEE JOINT: CPT | Mod: 80ROC,59 | Performed by: STUDENT IN AN ORGANIZED HEALTH CARE EDUCATION/TRAINING PROGRAM

## 2021-08-18 PROCEDURE — 160009 HCHG ANES TIME/MIN: Performed by: ORTHOPAEDIC SURGERY

## 2021-08-18 PROCEDURE — 29884 ARTHRS KNEE SURG LYSIS ADS: CPT | Mod: 80ROC,LT | Performed by: STUDENT IN AN ORGANIZED HEALTH CARE EDUCATION/TRAINING PROGRAM

## 2021-08-18 PROCEDURE — A9270 NON-COVERED ITEM OR SERVICE: HCPCS | Performed by: ANESTHESIOLOGY

## 2021-08-18 PROCEDURE — 700101 HCHG RX REV CODE 250: Performed by: ANESTHESIOLOGY

## 2021-08-18 PROCEDURE — 160029 HCHG SURGERY MINUTES - 1ST 30 MINS LEVEL 4: Performed by: ORTHOPAEDIC SURGERY

## 2021-08-18 PROCEDURE — 700111 HCHG RX REV CODE 636 W/ 250 OVERRIDE (IP): Performed by: STUDENT IN AN ORGANIZED HEALTH CARE EDUCATION/TRAINING PROGRAM

## 2021-08-18 PROCEDURE — 160035 HCHG PACU - 1ST 60 MINS PHASE I: Performed by: ORTHOPAEDIC SURGERY

## 2021-08-18 PROCEDURE — 160048 HCHG OR STATISTICAL LEVEL 1-5: Performed by: ORTHOPAEDIC SURGERY

## 2021-08-18 PROCEDURE — 700101 HCHG RX REV CODE 250: Performed by: ORTHOPAEDIC SURGERY

## 2021-08-18 PROCEDURE — 700102 HCHG RX REV CODE 250 W/ 637 OVERRIDE(OP): Performed by: ANESTHESIOLOGY

## 2021-08-18 PROCEDURE — 160025 RECOVERY II MINUTES (STATS): Performed by: ORTHOPAEDIC SURGERY

## 2021-08-18 PROCEDURE — 27570 FIXATION OF KNEE JOINT: CPT | Mod: 59 | Performed by: ORTHOPAEDIC SURGERY

## 2021-08-18 PROCEDURE — 160041 HCHG SURGERY MINUTES - EA ADDL 1 MIN LEVEL 4: Performed by: ORTHOPAEDIC SURGERY

## 2021-08-18 RX ORDER — HYDROMORPHONE HYDROCHLORIDE 1 MG/ML
0.1 INJECTION, SOLUTION INTRAMUSCULAR; INTRAVENOUS; SUBCUTANEOUS
Status: DISCONTINUED | OUTPATIENT
Start: 2021-08-18 | End: 2021-08-18 | Stop reason: HOSPADM

## 2021-08-18 RX ORDER — MEPERIDINE HYDROCHLORIDE 25 MG/ML
6.25 INJECTION INTRAMUSCULAR; INTRAVENOUS; SUBCUTANEOUS
Status: DISCONTINUED | OUTPATIENT
Start: 2021-08-18 | End: 2021-08-18 | Stop reason: HOSPADM

## 2021-08-18 RX ORDER — HYDROMORPHONE HYDROCHLORIDE 1 MG/ML
0.4 INJECTION, SOLUTION INTRAMUSCULAR; INTRAVENOUS; SUBCUTANEOUS
Status: DISCONTINUED | OUTPATIENT
Start: 2021-08-18 | End: 2021-08-18 | Stop reason: HOSPADM

## 2021-08-18 RX ORDER — HYDROMORPHONE HYDROCHLORIDE 1 MG/ML
0.2 INJECTION, SOLUTION INTRAMUSCULAR; INTRAVENOUS; SUBCUTANEOUS
Status: DISCONTINUED | OUTPATIENT
Start: 2021-08-18 | End: 2021-08-18 | Stop reason: HOSPADM

## 2021-08-18 RX ORDER — MIDAZOLAM HYDROCHLORIDE 1 MG/ML
INJECTION INTRAMUSCULAR; INTRAVENOUS PRN
Status: DISCONTINUED | OUTPATIENT
Start: 2021-08-18 | End: 2021-08-18 | Stop reason: SURG

## 2021-08-18 RX ORDER — SODIUM CHLORIDE, SODIUM LACTATE, POTASSIUM CHLORIDE, CALCIUM CHLORIDE 600; 310; 30; 20 MG/100ML; MG/100ML; MG/100ML; MG/100ML
INJECTION, SOLUTION INTRAVENOUS CONTINUOUS
Status: DISCONTINUED | OUTPATIENT
Start: 2021-08-18 | End: 2021-08-18 | Stop reason: HOSPADM

## 2021-08-18 RX ORDER — DEXAMETHASONE SODIUM PHOSPHATE 4 MG/ML
INJECTION, SOLUTION INTRA-ARTICULAR; INTRALESIONAL; INTRAMUSCULAR; INTRAVENOUS; SOFT TISSUE PRN
Status: DISCONTINUED | OUTPATIENT
Start: 2021-08-18 | End: 2021-08-18 | Stop reason: SURG

## 2021-08-18 RX ORDER — LIDOCAINE HYDROCHLORIDE 20 MG/ML
INJECTION, SOLUTION EPIDURAL; INFILTRATION; INTRACAUDAL; PERINEURAL PRN
Status: DISCONTINUED | OUTPATIENT
Start: 2021-08-18 | End: 2021-08-18 | Stop reason: SURG

## 2021-08-18 RX ORDER — ONDANSETRON 2 MG/ML
4 INJECTION INTRAMUSCULAR; INTRAVENOUS
Status: DISCONTINUED | OUTPATIENT
Start: 2021-08-18 | End: 2021-08-18 | Stop reason: HOSPADM

## 2021-08-18 RX ORDER — GABAPENTIN 300 MG/1
300 CAPSULE ORAL ONCE
Status: COMPLETED | OUTPATIENT
Start: 2021-08-18 | End: 2021-08-18

## 2021-08-18 RX ORDER — BUPIVACAINE HYDROCHLORIDE AND EPINEPHRINE 2.5; 5 MG/ML; UG/ML
INJECTION, SOLUTION EPIDURAL; INFILTRATION; INTRACAUDAL; PERINEURAL
Status: DISCONTINUED | OUTPATIENT
Start: 2021-08-18 | End: 2021-08-18 | Stop reason: HOSPADM

## 2021-08-18 RX ORDER — DOXYCYCLINE HYCLATE 100 MG
100 TABLET ORAL 2 TIMES DAILY
Qty: 14 TABLET | Refills: 0 | Status: SHIPPED | OUTPATIENT
Start: 2021-08-18 | End: 2021-08-25

## 2021-08-18 RX ORDER — SODIUM CHLORIDE, SODIUM LACTATE, POTASSIUM CHLORIDE, CALCIUM CHLORIDE 600; 310; 30; 20 MG/100ML; MG/100ML; MG/100ML; MG/100ML
INJECTION, SOLUTION INTRAVENOUS CONTINUOUS
Status: ACTIVE | OUTPATIENT
Start: 2021-08-18 | End: 2021-08-18

## 2021-08-18 RX ORDER — HALOPERIDOL 5 MG/ML
1 INJECTION INTRAMUSCULAR
Status: DISCONTINUED | OUTPATIENT
Start: 2021-08-18 | End: 2021-08-18 | Stop reason: HOSPADM

## 2021-08-18 RX ORDER — OXYCODONE HCL 5 MG/5 ML
10 SOLUTION, ORAL ORAL
Status: COMPLETED | OUTPATIENT
Start: 2021-08-18 | End: 2021-08-18

## 2021-08-18 RX ORDER — DIPHENHYDRAMINE HYDROCHLORIDE 50 MG/ML
12.5 INJECTION INTRAMUSCULAR; INTRAVENOUS
Status: DISCONTINUED | OUTPATIENT
Start: 2021-08-18 | End: 2021-08-18 | Stop reason: HOSPADM

## 2021-08-18 RX ORDER — ONDANSETRON 2 MG/ML
INJECTION INTRAMUSCULAR; INTRAVENOUS PRN
Status: DISCONTINUED | OUTPATIENT
Start: 2021-08-18 | End: 2021-08-18 | Stop reason: SURG

## 2021-08-18 RX ORDER — ONDANSETRON 4 MG/1
4 TABLET, ORALLY DISINTEGRATING ORAL EVERY 6 HOURS PRN
Qty: 20 TABLET | Refills: 0 | Status: SHIPPED | OUTPATIENT
Start: 2021-08-18 | End: 2022-01-17

## 2021-08-18 RX ORDER — ACETAMINOPHEN 500 MG
1000 TABLET ORAL ONCE
Status: COMPLETED | OUTPATIENT
Start: 2021-08-18 | End: 2021-08-18

## 2021-08-18 RX ORDER — ONDANSETRON 4 MG/1
4 TABLET, ORALLY DISINTEGRATING ORAL EVERY 6 HOURS PRN
Qty: 10 TABLET | Refills: 0 | Status: SHIPPED | OUTPATIENT
Start: 2021-08-18 | End: 2022-01-17 | Stop reason: SDUPTHER

## 2021-08-18 RX ORDER — OXYCODONE HCL 5 MG/5 ML
5 SOLUTION, ORAL ORAL
Status: COMPLETED | OUTPATIENT
Start: 2021-08-18 | End: 2021-08-18

## 2021-08-18 RX ADMIN — PROPOFOL 170 MG: 10 INJECTION, EMULSION INTRAVENOUS at 13:15

## 2021-08-18 RX ADMIN — DEXAMETHASONE SODIUM PHOSPHATE 8 MG: 4 INJECTION, SOLUTION INTRAMUSCULAR; INTRAVENOUS at 13:27

## 2021-08-18 RX ADMIN — GABAPENTIN 300 MG: 300 CAPSULE ORAL at 11:38

## 2021-08-18 RX ADMIN — SODIUM CHLORIDE, POTASSIUM CHLORIDE, SODIUM LACTATE AND CALCIUM CHLORIDE: 600; 310; 30; 20 INJECTION, SOLUTION INTRAVENOUS at 10:40

## 2021-08-18 RX ADMIN — ONDANSETRON 4 MG: 2 INJECTION INTRAMUSCULAR; INTRAVENOUS at 13:27

## 2021-08-18 RX ADMIN — DAPTOMYCIN 480 MG: 350 INJECTION, POWDER, LYOPHILIZED, FOR SOLUTION INTRAVENOUS at 13:06

## 2021-08-18 RX ADMIN — PROPOFOL 30 MG: 10 INJECTION, EMULSION INTRAVENOUS at 13:52

## 2021-08-18 RX ADMIN — FENTANYL CITRATE 50 MCG: 50 INJECTION, SOLUTION INTRAMUSCULAR; INTRAVENOUS at 13:40

## 2021-08-18 RX ADMIN — FENTANYL CITRATE 25 MCG: 50 INJECTION, SOLUTION INTRAMUSCULAR; INTRAVENOUS at 13:52

## 2021-08-18 RX ADMIN — FENTANYL CITRATE 50 MCG: 50 INJECTION, SOLUTION INTRAMUSCULAR; INTRAVENOUS at 14:51

## 2021-08-18 RX ADMIN — FENTANYL CITRATE 25 MCG: 50 INJECTION, SOLUTION INTRAMUSCULAR; INTRAVENOUS at 13:13

## 2021-08-18 RX ADMIN — OXYCODONE HYDROCHLORIDE 10 MG: 5 SOLUTION ORAL at 14:30

## 2021-08-18 RX ADMIN — SODIUM CHLORIDE, POTASSIUM CHLORIDE, SODIUM LACTATE AND CALCIUM CHLORIDE: 600; 310; 30; 20 INJECTION, SOLUTION INTRAVENOUS at 14:03

## 2021-08-18 RX ADMIN — LIDOCAINE HYDROCHLORIDE 50 MG: 20 INJECTION, SOLUTION EPIDURAL; INFILTRATION; INTRACAUDAL; PERINEURAL at 13:52

## 2021-08-18 RX ADMIN — FENTANYL CITRATE 50 MCG: 50 INJECTION, SOLUTION INTRAMUSCULAR; INTRAVENOUS at 13:25

## 2021-08-18 RX ADMIN — ACETAMINOPHEN 1000 MG: 500 TABLET, FILM COATED ORAL at 11:38

## 2021-08-18 RX ADMIN — LIDOCAINE HYDROCHLORIDE 50 MG: 20 INJECTION, SOLUTION EPIDURAL; INFILTRATION; INTRACAUDAL; PERINEURAL at 13:15

## 2021-08-18 RX ADMIN — MIDAZOLAM HYDROCHLORIDE 2 MG: 1 INJECTION, SOLUTION INTRAMUSCULAR; INTRAVENOUS at 13:13

## 2021-08-18 ASSESSMENT — PAIN DESCRIPTION - PAIN TYPE
TYPE: ACUTE PAIN
TYPE: SURGICAL PAIN

## 2021-08-18 NOTE — DISCHARGE INSTRUCTIONS
ACTIVITY: Rest and take it easy for the first 24 hours.  A responsible adult is recommended to remain with you during that time.  It is normal to feel sleepy.  We encourage you to not do anything that requires balance, judgment or coordination.    MILD FLU-LIKE SYMPTOMS ARE NORMAL. YOU MAY EXPERIENCE GENERALIZED MUSCLE ACHES, THROAT IRRITATION, HEADACHE AND/OR SOME NAUSEA.    FOR 24 HOURS DO NOT:  Drive, operate machinery or run household appliances.  Drink beer or alcoholic beverages.   Make important decisions or sign legal documents.    SPECIAL INSTRUCTIONS:     May bear weight as tolerated on left leg.  Ice and elevate left leg (Ice on for 20 minutes, off for 20 minutes).  Keep dressing clean, dry, and intact for 3 days, then may remove and shower.    DIET: To avoid nausea, slowly advance diet as tolerated, avoiding spicy or greasy foods for the first day.  Add more substantial food to your diet according to your physician's instructions.  INCREASE FLUIDS AND FIBER TO AVOID CONSTIPATION.    FOLLOW-UP APPOINTMENT:  A follow-up appointment should be arranged with your doctor in 2 weeks; call to schedule.    You should CALL YOUR PHYSICIAN if you develop:  Fever greater than 101 degrees F.  Pain not relieved by medication, or persistent nausea or vomiting.  Excessive bleeding (blood soaking through dressing) or unexpected drainage from the wound.  Extreme redness or swelling around the incision site, drainage of pus or foul smelling drainage.  Inability to urinate or empty your bladder within 8 hours.    You should call 911 if you develop problems with breathing or chest pain.  If you are unable to contact your doctor or surgical center, you should go to the nearest emergency room or urgent care center.      Dr. Moreno's telephone #: [phone]    If any questions arise, call your doctor.  If your doctor is not available, please feel free to call the Surgical Center at [phone]. The Contact Center is open  Monday through Friday 7AM to 5PM and may speak to a nurse at [phone], or toll free at [phone].     A registered nurse may call you a few days after your surgery to see how you are doing after your procedure.    MEDICATIONS: Resume taking daily medication.  Take prescribed pain medication with food.  If no medication is prescribed, you may take non-aspirin pain medication if needed.  PAIN MEDICATION CAN BE VERY CONSTIPATING.  Take a stool softener or laxative such as senokot, pericolace, or milk of magnesia if needed.    Prescriptions sent electronically to pharmacy- Doxycycline and Zofran .      Last pain medication given at 2:30 PM (Oxycodone 10 mg).      If your physician has prescribed pain medication that includes Acetaminophen (Tylenol), do not take additional Acetaminophen (Tylenol) while taking the prescribed medication.    Depression / Suicide Risk    As you are discharged from this [facility] facility, it is important to learn how to keep safe from harming yourself.    Recognize the warning signs:  · Abrupt changes in personality, positive or negative- including increase in energy   · Giving away possessions  · Change in eating patterns- significant weight changes-  positive or negative  · Change in sleeping patterns- unable to sleep or sleeping all the time   · Unwillingness or inability to communicate  · Depression  · Unusual sadness, discouragement and loneliness  · Talk of wanting to die  · Neglect of personal appearance   · Rebelliousness- reckless behavior  · Withdrawal from people/activities they love  · Confusion- inability to concentrate     If you or a loved one observes any of these behaviors or has concerns about self-harm, here's what you can do:  · Talk about it- your feelings and reasons for harming yourself  · Remove any means that you might use to hurt yourself (examples: pills, rope, extension cords, firearm)  · Get professional help from the community (Mental Health,  Substance Abuse, psychological counseling)  · Do not be alone:Call your Safe Contact- someone whom you trust who will be there for you.  · Call your local CRISIS HOTLINE 665-8347 or 592-756-1559  · Call your local Children's Mobile Crisis Response Team [place] (919) 811-2809 or www.VeedMe  · Call the toll free National Suicide Prevention Hotlines   · National Suicide Prevention Lifeline 157-134-XIFP (5632)  · National Hope Line Network 800-SUICIDE (656-7773)

## 2021-08-18 NOTE — OR NURSING
1415- To PACU from OR via rney. Sleeping, respirations spontaneous and non-labored via OPA with 6L O2 via mask. Icepack applied over c/d/i left knee surgical dressings.LLE pulse +2. LLE cap refill <2 seconds. LLE elevated. SCDs on. LR infusing via PIV.  1420- Rouses to voice, reports pain 5/10. Dr. Gutierrez at bedside to administer fentanyl.  1430- Rouses spontaneously. Reports pain 7/10. See MAR. O2 dc'd. PO fluids offered.  1445- Reports pain 8/10. See MAR.  1500- Reports pain 6/10 and tolerable. Friend updated via telephone.  1512- Meets criteria for transfer to stage 2. Report given to TREY Daniel.

## 2021-08-18 NOTE — INTERVAL H&P NOTE
H&P updated. The patient was examined and her history and physical is consistent with her last clinic note per Dr. Moreno:  on 6/28/21    The patient has been treated for arthrofibrosis left knee which has been a severe recurrent problem for her despite multiple surgeries including revision total knee arthroplasty.  She is currently scheduled for left knee arthroscopy and manipulation in August which I would consider appropriate per tickly since she is lost motion despite active participation in physical therapy and home exercises and pain management.  In addition she has experienced pain over the posterior lateral aspect of his her right knee which appears to be related to the stiffness in the left knee causing compensatory functional impairment and activity demands on the right knee, to compensate for limited left knee motion and function.     On exam left knee motion 0 to 80 degrees now and there is some tenderness over the posterior lateral right joint line but no effusion or instability is detected suggestive of meniscal pathology.X-rays have demonstrated mild arthritic changes in the right knee and stable position of the prosthetic components left knee.     I have again discussed treatment options for left knee arthrofibrosis including arthroscopy which the patient would like to proceed with as previously indicated and scheduled in the postop rehabilitation plan was also reviewed with her in detail as well as IV perioperative antibiotics given her history of prior infection, using daptomycin.  The only oral antibiotic she can tolerate is Levaquin for which she was given a prescription for upcoming dental prophylaxis.  I have also discussed potential sources of right knee pain would consider right knee MRI appropriate in order to evaluate any intra-articular pathology.

## 2021-08-18 NOTE — OR SURGEON
Immediate Post OP Note    PreOp Diagnosis: Arthrofibrosis of left total knee arthroplast      PostOp Diagnosis: Same      Procedure(s):  1. ARTHROSCOPY, KNEE. - Wound Class: Clean  2. Lysis of Adhesions  3. Manipulation under anesthesia    Surgeon(s):  HOLLIE Flores M.D.    Anesthesiologist/Type of Anesthesia:  Anesthesiologist: Ian Gutierrez M.D./General    Surgical Staff:  Circulator: Beba Buck R.N.  Relief Circulator: Giselle Redd R.N.  Scrub Person: Jay Garcia    Specimens removed if any:  * No specimens in log *    Estimated Blood Loss: 10cc    Findings: Extensive arthrofibrosis    Complications: None    DC home  7 days of prophylactic doxycycline ordered        8/18/2021 2:08 PM Misael Daly M.D.

## 2021-08-18 NOTE — OP REPORT
DATE OF SERVICE:  08/18/2021     PREOPERATIVE DIAGNOSIS:  Arthrofibrosis, left knee, status post revision left   total knee arthroplasty.     POSTOPERATIVE DIAGNOSIS:  Arthrofibrosis, left knee, status post revision left   total knee arthroplasty.     PROCEDURES:  Left knee arthroscopy, lysis of adhesions, and manipulation.     SURGEON:  Ruiz Moreno MD     ASSISTANT:  Misael Daly MD     ANESTHESIA:  General.     COMPLICATIONS:  None.     INDICATIONS:  The patient is a 60-year-old woman who had undergone prior left   total knee replacement complicated by arthrofibrosis, requiring revision   surgery, arthroscopy, pain management and extensive treatment with physical   therapy and exercise in an effort to overcome limited knee motion and gain   greater flexion of the knee.  However, the patient developed loss of motion   despite this treatment and was admitted for elective left knee arthroscopy and   lysis of adhesions with manipulation in an effort to increase knee flexion.     PROCEDURE:  After induction of satisfactory anesthesia, the patient was   positioned supine on the operating room table and a tourniquet applied to   proximal left thigh.  Intravenous daptomycin was administered and the left   lower extremity prepped and draped in the usual sterile fashion.     After the usual prep and drape, the knee was examined and demonstrated motion   10-80 degrees.  The knee was then distended by injecting the intra-articular   compartment of the knee with 50 mL of normal saline to allow safe insertion of   the arthroscopic instruments without damaging the articular surfaces.  An   inferolateral portal was made just lateral to the patellar tendon at the level   of the joint line through the prior arthroscopy scar, which was inserted into   the patellofemoral compartment for the arthroscope.  The arthroscope was   inserted into the patellofemoral compartment, which demonstrated complete   obliteration of the  compartment due to presence of scar throughout the knee.    This required use of a superolateral portal lateral to the proximal pole of   the patella where a portal was made for insertion of the shaver and this was   introduced so that it could be visualized along the tip of the arthroscope to   remove intra-articular scar.  Once this was removed sufficiently, the patellar   articular surface could be visualized and a suprapatellar pouch was gradually   formed by excising scar tissue in the suprapatellar pouch with the shaver   used an alternating fashion with the ArthroPort device.  The scar was found to   be extremely dense and occupied the entire suprapatellar pouch, which was   fully excised extending proximal to the suprapatellar pouch to the point where   muscle could be seen in the suprapatellar pouch, thereby potentially removing   scar tissue, tethering the quadriceps to the distal femur in an effort to   gain more flexion.  Once the scar was removed sufficiently, the suprapatellar   pouch was more distended with saline and this allowed visualization of the   medial and lateral gutters where there was considerable scar that was excised   using the portals described as well as an additional superomedial portal   proximal to the medial pole of the patella.  These were used in alternating   fashion with the ArthroPort and shaver device to gradually excise dense scar   and the dissection was continued distally to the level of the anterior tibial   insert.  The arthroscope was then moved to the proximal superomedial and also   a superolateral portal to visualize the inferior patella and remove dense scar   along the inferior patella anterior to the tibial insert.  Sufficient scar   was removed that the anterior tibial polyethylene insert could be well   visualized in this fashion as well as the femoral component.  The dissection   was continued circumferentially around the patella also to remove any   impinging  soft tissue between the patella and femoral component and then the   arthroscopic instruments were removed and knee manipulated to 95 degrees of   flexion with gradual attenuation of soft tissues palpated during the   manipulation procedure.  Following this, the arthroscopic instruments were   reinserted to visualize the knee again and additional synovium removed   particularly from the suprapatellar pouch and medial and lateral gutters,   which were thoroughly excised of soft tissue.  Once the dissection was   continued circumferentially in this fashion using all the portals described in   an alternating fashion, the arthroscopic instruments were again removed and   knee fully flexed as much as feasible to give a final manipulation prior to   wound closure.  The tourniquet had been inflated during the procedure and was   deflated prior to closure.  The knee was irrigated with lavage saline through   the arthroscopic portals followed by closure of each of the portal sites with   single nylon sutures, application of a sterile dressing and Ace wrap from toes   to groin.  The patient tolerated the procedure well and returned to recovery   room in stable condition.        ______________________________  MD SARINA Sainz/SHELLEY    DD:  08/18/2021 14:08  DT:  08/18/2021 16:29    Job#:  [id number]

## 2021-08-18 NOTE — OR NURSING
1004: Brought patient back to pre-op and assumed care.  1046: Patient allergies and NPO status verified, home medication reconciliation completed and belongings secured. Patient verbalizes understanding of pain scale, expected course of stay and plan of care. Surgical site verified with patient. IV access established. Sequentials placed on legs.

## 2021-08-18 NOTE — LETTER
August 4, 2021    [facility]  [address]   [place] [address]  [phone]    Patient Name: Lesley Hernandez  Surgeon Name: Surgeon(s):  Ruiz Moreno M.D.  [facility] [facility]  Surgery Date: 8/18/2021    The time of your surgery is not final and may change up to and until the day of your surgery. You will be contacted 24-48 hours prior to your surgery date with your check-in and surgery time.    If you will not be at one of the below numbers please call/text the surgery scheduler at   Cell Phone: [phone] ext [phone]    BEFORE YOUR SURGERY  Pre Registration and/or Lab Work must be done within and no earlier than 28 days prior to your surgery date. Please call [phone] for an appointment as soon as possible.      Please refrain from smoking any substance after midnight prior to surgery. Smoking may interfere with the anesthetic and frequently produces nausea during the recovery period.    Continue taking all lifesaving medications. Including the morning of your surgery with small sip of water.    Please read the MEDICATION INSTRUCTIONS below completely.    DAY OF YOUR SURGERY  Nothing to eat or drink after midnight     Please arrive at the hospital/surgery center at the check-in time provided.     An adult will need to bring you and take you home after your surgery.     AFTER YOUR SURGERY  Post op Appointment:   Date:   Time:   With:    Location: [address] [place] [address]      TIME OFF WORK  FMLA or Disability forms can be faxed directly to: [phone] or you may drop them off at [address] [place], [place] [id number]. Our office charges a $20.00 fee per form. Forms will be completed within 10 business days of drop off and payment received. For the status of your forms you may contact our disability office directly [phone].    MEDICATION INSTRUCTIONS  The following medications should  be stopped a minimum of 10 days prior to surgery:  All over the counter, Supplements & Herbal medications    Anorectics: Phentermine (Adipex-P, Lomaira and Suprenza), Phentermine-topiramate (Qsymia), Bupropion-naltrexone (Contrave)    Opiod Partial Agonists/Opioid Antagonists: Buprenorphine (Subocone, Belbuca, Butrans, Probuphine Implant, Sublocade), Naltrexone (ReVia, Vivitrol), Naloxone    Amphetamines: Dextroamphetamine/Amphetamine (Adderall, Mydayis), Methylphenidate Hydrochloride (Concerta, Metadate, Methylin, Ritalin)    The following medications should be stopped 5 days prior to surgery:  Blood Thinners: Any Aspirin, Aspirin products, anti-inflammatories such as ibuprofen and any blood thinners such as Coumadin and Plavix. Please consult your prescribing physician if you are on life saving blood thinners, in regards to when to stop medications prior to surgery.     The following medications should be stopped a minimum of 3 days prior to surgery:  PDE-5 inhibitors: Sildenafil (Viagra), Tadalafil (Cialis), Vardenafil (Levitra), Avanafil (Stendra)    MAO Inhibitors: Rasagiline (Azilect), Selegiline (Eldepryl, Emsam, Selapar), Isocarboxazid (Marplan), Phenelzine (Nardil)

## 2021-08-18 NOTE — ANESTHESIA TIME REPORT
Anesthesia Start and Stop Event Times     Date Time Event    8/18/2021 1227 Ready for Procedure     1310 Anesthesia Start     1416 Anesthesia Stop        Responsible Staff  08/18/21    Name Role Begin End    Ian Gutierrez M.D. Anesth 1310 1416        Preop Diagnosis (Free Text):  Pre-op Diagnosis     Primary osteoarthritis of left knee [M17.12]        Preop Diagnosis (Codes):  Diagnosis Information     Diagnosis Code(s): Primary osteoarthritis of left knee [M17.12]        Post op Diagnosis  Arthritis of left knee      Premium Reason  Non-Premium    Comments:

## 2021-08-18 NOTE — ANESTHESIA PROCEDURE NOTES
Airway    Date/Time: 8/18/2021 1:16 PM  Performed by: Ian Gutierrez M.D.  Authorized by: Ian Gutierrez M.D.     Location:  OR  Urgency:  Elective  Indications for Airway Management:  Anesthesia      Spontaneous Ventilation: absent    Sedation Level:  Deep  Preoxygenated: Yes    Final Airway Type:  Supraglottic airway  Final Supraglottic Airway:  Standard LMA    SGA Size:  4  Number of Attempts at Approach:  1

## 2021-08-18 NOTE — ANESTHESIA POSTPROCEDURE EVALUATION
Patient: Lesley Hernandez    Procedure Summary     Date: 08/18/21 Room / Location:  OR 06 / SURGERY [facility]    Anesthesia Start: 1310 Anesthesia Stop: 1416    Procedures:       ARTHROSCOPY, KNEE. (Left Knee)      MANIPULATION, KNEE (Left Knee)      LYSIS, ADHESIONS, LAPAROSCOPIC (Left Knee) Diagnosis:       Primary osteoarthritis of left knee      (Primary osteoarthritis of left knee [M17.12])    Surgeons: Ruiz Moreno M.D. Responsible Provider: Ian Gutierrez M.D.    Anesthesia Type: general ASA Status: 2          Final Anesthesia Type: general  Last vitals  BP   Blood Pressure: 125/81    Temp   36.3 °C (97.3 °F)    Pulse   (!) 59   Resp   16    SpO2   91 %      Anesthesia Post Evaluation    Patient location during evaluation: PACU  Patient participation: complete - patient participated  Level of consciousness: awake and alert    Airway patency: patent  Anesthetic complications: no  Cardiovascular status: hemodynamically stable  Respiratory status: acceptable  Hydration status: euvolemic    PONV: none          No complications documented.     Nurse Pain Score: 3 (NPRS)

## 2021-08-18 NOTE — ANESTHESIA PREPROCEDURE EVALUATION
Hx DVT  Multiple drug allergies  Regular exercise  On NOrco    Relevant Problems   No relevant active problems       Physical Exam    Airway   Mallampati: II  TM distance: >3 FB  Neck ROM: full       Cardiovascular - normal exam  Rhythm: regular  Rate: normal  (-) murmur     Dental - normal exam           Pulmonary - normal exam  Breath sounds clear to auscultation     Abdominal    Neurological - normal exam                 Anesthesia Plan    ASA 2       Plan - general       Airway plan will be LMA          Induction: intravenous    Postoperative Plan: Postoperative administration of opioids is intended.    Pertinent diagnostic labs and testing reviewed    Informed Consent:    Anesthetic plan and risks discussed with patient.    Use of blood products discussed with: patient whom consented to blood products.

## 2021-11-08 PROBLEM — Z96.659 FAILED TOTAL KNEE ARTHROPLASTY, INITIAL ENCOUNTER (HCC): Status: ACTIVE | Noted: 2021-11-08

## 2021-11-08 PROBLEM — T84.018A FAILED TOTAL KNEE ARTHROPLASTY, INITIAL ENCOUNTER (HCC): Status: ACTIVE | Noted: 2021-11-08

## 2021-12-01 ENCOUNTER — PRE-ADMISSION TESTING (OUTPATIENT)
Dept: ADMISSIONS | Facility: MEDICAL CENTER | Age: 61
End: 2021-12-01
Attending: ORTHOPAEDIC SURGERY
Payer: COMMERCIAL

## 2021-12-01 RX ORDER — HYDROCODONE BITARTRATE AND ACETAMINOPHEN 10; 325 MG/1; MG/1
1 TABLET ORAL 2 TIMES DAILY PRN
COMMUNITY
Start: 2021-11-23

## 2021-12-01 NOTE — PREPROCEDURE INSTRUCTIONS
"Pre-admit appointment completed. \"Preparing for your Procedure\" instructions given to Pt via phone with verbal, emailed written instructions, along with video content. Pt states all instructions given are understood and to call pre-admit or Dr's office for additional questions or any symptoms of illness/covid develop prior to DOS. Medications the patient will take the morning of surgery per anesthesia protocol: Norco if needed.    Denies anesthesia complications  "

## 2021-12-02 NOTE — DISCHARGE PLANNING
DISCHARGE PLANNING NOTE - TOTAL JOINT    Procedure: Procedure(s):  REVISION, TOTAL ARTHROPLASTY, KNEE, PATELLA  AND TIBIAL INSERT  Procedure Date: 12/15/2021  Insurance: Payor: [employer] / Plan: [employer] PATHWAY / Product Type: *No Product type* /    Equipment currently available at home?  crutches  Steps into the home? 0  Steps within the home? 1 flight  Toilet height? ADA  Type of shower? walk-in shower  Who will be with you during your recovery? Mariah, friend  Is Outpatient Physical Therapy set up after surgery? Yes  Did you take the Total Joint Class and where? Yes  Planning same day discharge?depends on how things go.    This writer spoke with pt on the phone during her preadmission appointment. Pt states she has all needed equipment and her friend, Mariah Mills, will be her transportation/surgery naima.Pt educated to dc criteria. All questions answered and verbalizes understanding of all instructions. No dc needs identified at this time. Anticipate dc to home without barriers.

## 2021-12-10 ENCOUNTER — PRE-ADMISSION TESTING (OUTPATIENT)
Dept: ADMISSIONS | Facility: MEDICAL CENTER | Age: 61
End: 2021-12-10
Attending: ORTHOPAEDIC SURGERY
Payer: COMMERCIAL

## 2021-12-10 DIAGNOSIS — Z01.812 PRE-OPERATIVE LABORATORY EXAMINATION: ICD-10-CM

## 2021-12-10 LAB — COVID ORDER STATUS COVID19: NORMAL

## 2021-12-11 LAB
SARS-COV-2 RNA RESP QL NAA+PROBE: NOTDETECTED
SPECIMEN SOURCE: NORMAL

## 2021-12-15 ENCOUNTER — HOSPITAL ENCOUNTER (OUTPATIENT)
Facility: MEDICAL CENTER | Age: 61
End: 2021-12-16
Attending: ORTHOPAEDIC SURGERY | Admitting: ORTHOPAEDIC SURGERY
Payer: COMMERCIAL

## 2021-12-15 ENCOUNTER — ANESTHESIA (OUTPATIENT)
Dept: SURGERY | Facility: MEDICAL CENTER | Age: 61
End: 2021-12-15
Payer: COMMERCIAL

## 2021-12-15 ENCOUNTER — APPOINTMENT (OUTPATIENT)
Dept: RADIOLOGY | Facility: MEDICAL CENTER | Age: 61
End: 2021-12-15
Attending: STUDENT IN AN ORGANIZED HEALTH CARE EDUCATION/TRAINING PROGRAM
Payer: COMMERCIAL

## 2021-12-15 ENCOUNTER — ANESTHESIA EVENT (OUTPATIENT)
Dept: SURGERY | Facility: MEDICAL CENTER | Age: 61
End: 2021-12-15
Payer: COMMERCIAL

## 2021-12-15 DIAGNOSIS — Z96.659 FAILED TOTAL KNEE ARTHROPLASTY, INITIAL ENCOUNTER (HCC): ICD-10-CM

## 2021-12-15 DIAGNOSIS — M24.662 ARTHROFIBROSIS OF KNEE JOINT, LEFT: Primary | ICD-10-CM

## 2021-12-15 DIAGNOSIS — T84.018A FAILED TOTAL KNEE ARTHROPLASTY, INITIAL ENCOUNTER (HCC): ICD-10-CM

## 2021-12-15 PROBLEM — T84.82XA ARTHROFIBROSIS OF TOTAL KNEE ARTHROPLASTY, INITIAL ENCOUNTER (HCC): Status: ACTIVE | Noted: 2021-12-15

## 2021-12-15 PROCEDURE — 700105 HCHG RX REV CODE 258: Performed by: ANESTHESIOLOGY

## 2021-12-15 PROCEDURE — G0378 HOSPITAL OBSERVATION PER HR: HCPCS

## 2021-12-15 PROCEDURE — 700102 HCHG RX REV CODE 250 W/ 637 OVERRIDE(OP): Performed by: STUDENT IN AN ORGANIZED HEALTH CARE EDUCATION/TRAINING PROGRAM

## 2021-12-15 PROCEDURE — 160002 HCHG RECOVERY MINUTES (STAT): Performed by: ORTHOPAEDIC SURGERY

## 2021-12-15 PROCEDURE — 700101 HCHG RX REV CODE 250: Performed by: ANESTHESIOLOGY

## 2021-12-15 PROCEDURE — 700101 HCHG RX REV CODE 250: Performed by: ORTHOPAEDIC SURGERY

## 2021-12-15 PROCEDURE — 502000 HCHG MISC OR IMPLANTS RC 0278: Performed by: ORTHOPAEDIC SURGERY

## 2021-12-15 PROCEDURE — A9270 NON-COVERED ITEM OR SERVICE: HCPCS | Performed by: ANESTHESIOLOGY

## 2021-12-15 PROCEDURE — C1776 JOINT DEVICE (IMPLANTABLE): HCPCS | Performed by: ORTHOPAEDIC SURGERY

## 2021-12-15 PROCEDURE — A9270 NON-COVERED ITEM OR SERVICE: HCPCS | Performed by: STUDENT IN AN ORGANIZED HEALTH CARE EDUCATION/TRAINING PROGRAM

## 2021-12-15 PROCEDURE — 73560 X-RAY EXAM OF KNEE 1 OR 2: CPT | Mod: LT

## 2021-12-15 PROCEDURE — 96375 TX/PRO/DX INJ NEW DRUG ADDON: CPT

## 2021-12-15 PROCEDURE — 160009 HCHG ANES TIME/MIN: Performed by: ORTHOPAEDIC SURGERY

## 2021-12-15 PROCEDURE — C1713 ANCHOR/SCREW BN/BN,TIS/BN: HCPCS | Performed by: ORTHOPAEDIC SURGERY

## 2021-12-15 PROCEDURE — 501838 HCHG SUTURE GENERAL: Performed by: ORTHOPAEDIC SURGERY

## 2021-12-15 PROCEDURE — 502579 HCHG PACK, TOTAL KNEE: Performed by: ORTHOPAEDIC SURGERY

## 2021-12-15 PROCEDURE — 160048 HCHG OR STATISTICAL LEVEL 1-5: Performed by: ORTHOPAEDIC SURGERY

## 2021-12-15 PROCEDURE — 700102 HCHG RX REV CODE 250 W/ 637 OVERRIDE(OP): Performed by: ANESTHESIOLOGY

## 2021-12-15 PROCEDURE — 160036 HCHG PACU - EA ADDL 30 MINS PHASE I: Performed by: ORTHOPAEDIC SURGERY

## 2021-12-15 PROCEDURE — 700105 HCHG RX REV CODE 258: Performed by: STUDENT IN AN ORGANIZED HEALTH CARE EDUCATION/TRAINING PROGRAM

## 2021-12-15 PROCEDURE — 160031 HCHG SURGERY MINUTES - 1ST 30 MINS LEVEL 5: Performed by: ORTHOPAEDIC SURGERY

## 2021-12-15 PROCEDURE — 700111 HCHG RX REV CODE 636 W/ 250 OVERRIDE (IP): Performed by: ANESTHESIOLOGY

## 2021-12-15 PROCEDURE — 27486 REVISE/REPLACE KNEE JOINT: CPT | Mod: LT | Performed by: ORTHOPAEDIC SURGERY

## 2021-12-15 PROCEDURE — 64447 NJX AA&/STRD FEMORAL NRV IMG: CPT | Performed by: ORTHOPAEDIC SURGERY

## 2021-12-15 PROCEDURE — 160035 HCHG PACU - 1ST 60 MINS PHASE I: Performed by: ORTHOPAEDIC SURGERY

## 2021-12-15 PROCEDURE — 160042 HCHG SURGERY MINUTES - EA ADDL 1 MIN LEVEL 5: Performed by: ORTHOPAEDIC SURGERY

## 2021-12-15 PROCEDURE — 700105 HCHG RX REV CODE 258: Performed by: ORTHOPAEDIC SURGERY

## 2021-12-15 PROCEDURE — 96374 THER/PROPH/DIAG INJ IV PUSH: CPT

## 2021-12-15 PROCEDURE — 700111 HCHG RX REV CODE 636 W/ 250 OVERRIDE (IP): Performed by: ORTHOPAEDIC SURGERY

## 2021-12-15 PROCEDURE — 700111 HCHG RX REV CODE 636 W/ 250 OVERRIDE (IP): Performed by: STUDENT IN AN ORGANIZED HEALTH CARE EDUCATION/TRAINING PROGRAM

## 2021-12-15 PROCEDURE — 27486 REVISE/REPLACE KNEE JOINT: CPT | Mod: 80ROC,LT | Performed by: STUDENT IN AN ORGANIZED HEALTH CARE EDUCATION/TRAINING PROGRAM

## 2021-12-15 PROCEDURE — 700111 HCHG RX REV CODE 636 W/ 250 OVERRIDE (IP): Performed by: PHYSICIAN ASSISTANT

## 2021-12-15 PROCEDURE — 96376 TX/PRO/DX INJ SAME DRUG ADON: CPT

## 2021-12-15 DEVICE — MATRIX TISSUE PLACENTAL PARTICULATE ALLOGRAFT AMNIOBAND 160MG (1EA): Type: IMPLANTABLE DEVICE | Site: KNEE | Status: FUNCTIONAL

## 2021-12-15 DEVICE — IMPLANTABLE DEVICE: Type: IMPLANTABLE DEVICE | Site: KNEE | Status: FUNCTIONAL

## 2021-12-15 RX ORDER — SODIUM CHLORIDE, SODIUM LACTATE, POTASSIUM CHLORIDE, CALCIUM CHLORIDE 600; 310; 30; 20 MG/100ML; MG/100ML; MG/100ML; MG/100ML
INJECTION, SOLUTION INTRAVENOUS
Status: DISCONTINUED | OUTPATIENT
Start: 2021-12-15 | End: 2021-12-15 | Stop reason: SURG

## 2021-12-15 RX ORDER — MIDAZOLAM HYDROCHLORIDE 1 MG/ML
INJECTION INTRAMUSCULAR; INTRAVENOUS PRN
Status: DISCONTINUED | OUTPATIENT
Start: 2021-12-15 | End: 2021-12-15 | Stop reason: SURG

## 2021-12-15 RX ORDER — ONDANSETRON 2 MG/ML
4 INJECTION INTRAMUSCULAR; INTRAVENOUS
Status: COMPLETED | OUTPATIENT
Start: 2021-12-15 | End: 2021-12-15

## 2021-12-15 RX ORDER — AMOXICILLIN 250 MG
1 CAPSULE ORAL NIGHTLY
Status: DISCONTINUED | OUTPATIENT
Start: 2021-12-15 | End: 2021-12-16 | Stop reason: HOSPADM

## 2021-12-15 RX ORDER — ACETAMINOPHEN 500 MG
1000 TABLET ORAL EVERY 6 HOURS
Status: DISCONTINUED | OUTPATIENT
Start: 2021-12-15 | End: 2021-12-16 | Stop reason: HOSPADM

## 2021-12-15 RX ORDER — DOCUSATE SODIUM 100 MG/1
100 CAPSULE, LIQUID FILLED ORAL 2 TIMES DAILY
Status: DISCONTINUED | OUTPATIENT
Start: 2021-12-15 | End: 2021-12-16 | Stop reason: HOSPADM

## 2021-12-15 RX ORDER — DEXAMETHASONE SODIUM PHOSPHATE 4 MG/ML
10 INJECTION, SOLUTION INTRA-ARTICULAR; INTRALESIONAL; INTRAMUSCULAR; INTRAVENOUS; SOFT TISSUE ONCE
Status: COMPLETED | OUTPATIENT
Start: 2021-12-16 | End: 2021-12-16

## 2021-12-15 RX ORDER — DIPHENHYDRAMINE HYDROCHLORIDE 50 MG/ML
12.5 INJECTION INTRAMUSCULAR; INTRAVENOUS
Status: DISCONTINUED | OUTPATIENT
Start: 2021-12-15 | End: 2021-12-15 | Stop reason: HOSPADM

## 2021-12-15 RX ORDER — TRANEXAMIC ACID 100 MG/ML
1000 INJECTION, SOLUTION INTRAVENOUS ONCE
Status: DISCONTINUED | OUTPATIENT
Start: 2021-12-15 | End: 2021-12-15 | Stop reason: HOSPADM

## 2021-12-15 RX ORDER — BISACODYL 10 MG
10 SUPPOSITORY, RECTAL RECTAL
Status: DISCONTINUED | OUTPATIENT
Start: 2021-12-15 | End: 2021-12-16 | Stop reason: HOSPADM

## 2021-12-15 RX ORDER — HYDROMORPHONE HYDROCHLORIDE 2 MG/ML
INJECTION, SOLUTION INTRAMUSCULAR; INTRAVENOUS; SUBCUTANEOUS PRN
Status: DISCONTINUED | OUTPATIENT
Start: 2021-12-15 | End: 2021-12-15 | Stop reason: SURG

## 2021-12-15 RX ORDER — TRAMADOL HYDROCHLORIDE 50 MG/1
50 TABLET ORAL EVERY 4 HOURS PRN
Qty: 60 TABLET | Refills: 0 | Status: SHIPPED | OUTPATIENT
Start: 2021-12-15 | End: 2022-01-17

## 2021-12-15 RX ORDER — DEXAMETHASONE SODIUM PHOSPHATE 4 MG/ML
INJECTION, SOLUTION INTRA-ARTICULAR; INTRALESIONAL; INTRAMUSCULAR; INTRAVENOUS; SOFT TISSUE PRN
Status: DISCONTINUED | OUTPATIENT
Start: 2021-12-15 | End: 2021-12-15 | Stop reason: SURG

## 2021-12-15 RX ORDER — TRANEXAMIC ACID 100 MG/ML
INJECTION, SOLUTION INTRAVENOUS PRN
Status: DISCONTINUED | OUTPATIENT
Start: 2021-12-15 | End: 2021-12-15 | Stop reason: SURG

## 2021-12-15 RX ORDER — CEFAZOLIN SODIUM 1 G/3ML
2 INJECTION, POWDER, FOR SOLUTION INTRAMUSCULAR; INTRAVENOUS ONCE
Status: DISCONTINUED | OUTPATIENT
Start: 2021-12-15 | End: 2021-12-15 | Stop reason: HOSPADM

## 2021-12-15 RX ORDER — ONDANSETRON 2 MG/ML
4 INJECTION INTRAMUSCULAR; INTRAVENOUS EVERY 4 HOURS PRN
Status: DISCONTINUED | OUTPATIENT
Start: 2021-12-15 | End: 2021-12-16 | Stop reason: HOSPADM

## 2021-12-15 RX ORDER — ASPIRIN 81 MG/1
81 TABLET, CHEWABLE ORAL
Qty: 56 TABLET | Refills: 0 | Status: SHIPPED | OUTPATIENT
Start: 2021-12-15 | End: 2022-01-17

## 2021-12-15 RX ORDER — IBUPROFEN 400 MG/1
800 TABLET ORAL 3 TIMES DAILY PRN
Status: DISCONTINUED | OUTPATIENT
Start: 2021-12-18 | End: 2021-12-16 | Stop reason: HOSPADM

## 2021-12-15 RX ORDER — ONDANSETRON 4 MG/1
4 TABLET, ORALLY DISINTEGRATING ORAL EVERY 6 HOURS PRN
Qty: 20 TABLET | Refills: 2 | Status: SHIPPED | OUTPATIENT
Start: 2021-12-15

## 2021-12-15 RX ORDER — SODIUM CHLORIDE 9 MG/ML
INJECTION, SOLUTION INTRAMUSCULAR; INTRAVENOUS; SUBCUTANEOUS
Status: DISCONTINUED | OUTPATIENT
Start: 2021-12-15 | End: 2021-12-15 | Stop reason: HOSPADM

## 2021-12-15 RX ORDER — BUPIVACAINE HYDROCHLORIDE 5 MG/ML
INJECTION, SOLUTION EPIDURAL; INTRACAUDAL
Status: DISCONTINUED | OUTPATIENT
Start: 2021-12-15 | End: 2021-12-15 | Stop reason: HOSPADM

## 2021-12-15 RX ORDER — HALOPERIDOL 5 MG/ML
1 INJECTION INTRAMUSCULAR
Status: DISCONTINUED | OUTPATIENT
Start: 2021-12-15 | End: 2021-12-15 | Stop reason: HOSPADM

## 2021-12-15 RX ORDER — HYDROMORPHONE HYDROCHLORIDE 1 MG/ML
0.4 INJECTION, SOLUTION INTRAMUSCULAR; INTRAVENOUS; SUBCUTANEOUS
Status: DISCONTINUED | OUTPATIENT
Start: 2021-12-15 | End: 2021-12-15 | Stop reason: HOSPADM

## 2021-12-15 RX ORDER — SODIUM CHLORIDE, SODIUM LACTATE, POTASSIUM CHLORIDE, CALCIUM CHLORIDE 600; 310; 30; 20 MG/100ML; MG/100ML; MG/100ML; MG/100ML
INJECTION, SOLUTION INTRAVENOUS CONTINUOUS
Status: DISCONTINUED | OUTPATIENT
Start: 2021-12-15 | End: 2021-12-15 | Stop reason: HOSPADM

## 2021-12-15 RX ORDER — DOXYCYCLINE HYCLATE 100 MG
100 TABLET ORAL 2 TIMES DAILY
Qty: 14 TABLET | Refills: 0 | Status: SHIPPED | OUTPATIENT
Start: 2021-12-16 | End: 2022-01-17

## 2021-12-15 RX ORDER — ONDANSETRON 4 MG/1
4 TABLET, ORALLY DISINTEGRATING ORAL EVERY 6 HOURS PRN
Qty: 10 TABLET | Refills: 0 | Status: SHIPPED | OUTPATIENT
Start: 2021-12-15 | End: 2021-12-15 | Stop reason: SDUPTHER

## 2021-12-15 RX ORDER — ONDANSETRON 2 MG/ML
INJECTION INTRAMUSCULAR; INTRAVENOUS PRN
Status: DISCONTINUED | OUTPATIENT
Start: 2021-12-15 | End: 2021-12-15 | Stop reason: SURG

## 2021-12-15 RX ORDER — BUPIVACAINE HYDROCHLORIDE 2.5 MG/ML
INJECTION, SOLUTION EPIDURAL; INFILTRATION; INTRACAUDAL
Status: COMPLETED | OUTPATIENT
Start: 2021-12-15 | End: 2021-12-15

## 2021-12-15 RX ORDER — POLYETHYLENE GLYCOL 3350 17 G/17G
1 POWDER, FOR SOLUTION ORAL 2 TIMES DAILY PRN
Status: DISCONTINUED | OUTPATIENT
Start: 2021-12-15 | End: 2021-12-16 | Stop reason: HOSPADM

## 2021-12-15 RX ORDER — HYDROMORPHONE HYDROCHLORIDE 1 MG/ML
0.6 INJECTION, SOLUTION INTRAMUSCULAR; INTRAVENOUS; SUBCUTANEOUS
Status: DISCONTINUED | OUTPATIENT
Start: 2021-12-15 | End: 2021-12-15 | Stop reason: HOSPADM

## 2021-12-15 RX ORDER — ACETAMINOPHEN 500 MG
1000 TABLET ORAL EVERY 6 HOURS PRN
Status: DISCONTINUED | OUTPATIENT
Start: 2021-12-20 | End: 2021-12-16 | Stop reason: HOSPADM

## 2021-12-15 RX ORDER — KETOROLAC TROMETHAMINE 30 MG/ML
30 INJECTION, SOLUTION INTRAMUSCULAR; INTRAVENOUS EVERY 6 HOURS
Status: DISCONTINUED | OUTPATIENT
Start: 2021-12-15 | End: 2021-12-16 | Stop reason: HOSPADM

## 2021-12-15 RX ORDER — CELECOXIB 100 MG/1
100 CAPSULE ORAL 2 TIMES DAILY
Qty: 60 CAPSULE | Refills: 2 | Status: SHIPPED | OUTPATIENT
Start: 2021-12-15

## 2021-12-15 RX ORDER — OXYCODONE HCL 5 MG/5 ML
10 SOLUTION, ORAL ORAL
Status: COMPLETED | OUTPATIENT
Start: 2021-12-15 | End: 2021-12-15

## 2021-12-15 RX ORDER — EPINEPHRINE 1 MG/ML(1)
AMPUL (ML) INJECTION
Status: DISCONTINUED | OUTPATIENT
Start: 2021-12-15 | End: 2021-12-15 | Stop reason: HOSPADM

## 2021-12-15 RX ORDER — DIPHENHYDRAMINE HYDROCHLORIDE 50 MG/ML
25 INJECTION INTRAMUSCULAR; INTRAVENOUS EVERY 6 HOURS PRN
Status: DISCONTINUED | OUTPATIENT
Start: 2021-12-15 | End: 2021-12-16 | Stop reason: HOSPADM

## 2021-12-15 RX ORDER — HYDROMORPHONE HYDROCHLORIDE 1 MG/ML
1 INJECTION, SOLUTION INTRAMUSCULAR; INTRAVENOUS; SUBCUTANEOUS
Status: DISCONTINUED | OUTPATIENT
Start: 2021-12-15 | End: 2021-12-16 | Stop reason: HOSPADM

## 2021-12-15 RX ORDER — DEXAMETHASONE SODIUM PHOSPHATE 4 MG/ML
4 INJECTION, SOLUTION INTRA-ARTICULAR; INTRALESIONAL; INTRAMUSCULAR; INTRAVENOUS; SOFT TISSUE
Status: DISCONTINUED | OUTPATIENT
Start: 2021-12-15 | End: 2021-12-16 | Stop reason: HOSPADM

## 2021-12-15 RX ORDER — MEPERIDINE HYDROCHLORIDE 25 MG/ML
12.5 INJECTION INTRAMUSCULAR; INTRAVENOUS; SUBCUTANEOUS
Status: DISCONTINUED | OUTPATIENT
Start: 2021-12-15 | End: 2021-12-15 | Stop reason: HOSPADM

## 2021-12-15 RX ORDER — ENEMA 19; 7 G/133ML; G/133ML
1 ENEMA RECTAL
Status: DISCONTINUED | OUTPATIENT
Start: 2021-12-15 | End: 2021-12-16 | Stop reason: HOSPADM

## 2021-12-15 RX ORDER — HYDROMORPHONE HYDROCHLORIDE 1 MG/ML
1 INJECTION, SOLUTION INTRAMUSCULAR; INTRAVENOUS; SUBCUTANEOUS
Status: DISCONTINUED | OUTPATIENT
Start: 2021-12-15 | End: 2021-12-15

## 2021-12-15 RX ORDER — SODIUM CHLORIDE, SODIUM LACTATE, POTASSIUM CHLORIDE, CALCIUM CHLORIDE 600; 310; 30; 20 MG/100ML; MG/100ML; MG/100ML; MG/100ML
INJECTION, SOLUTION INTRAVENOUS CONTINUOUS
Status: ACTIVE | OUTPATIENT
Start: 2021-12-15 | End: 2021-12-15

## 2021-12-15 RX ORDER — KETOROLAC TROMETHAMINE 30 MG/ML
INJECTION, SOLUTION INTRAMUSCULAR; INTRAVENOUS
Status: DISCONTINUED | OUTPATIENT
Start: 2021-12-15 | End: 2021-12-15 | Stop reason: HOSPADM

## 2021-12-15 RX ORDER — OXYCODONE HYDROCHLORIDE 10 MG/1
20 TABLET ORAL
Status: DISCONTINUED | OUTPATIENT
Start: 2021-12-15 | End: 2021-12-15

## 2021-12-15 RX ORDER — POLYETHYLENE GLYCOL 3350 17 G/17G
17 POWDER, FOR SOLUTION ORAL DAILY
Qty: 20 EACH | Refills: 3 | Status: SHIPPED | OUTPATIENT
Start: 2021-12-15

## 2021-12-15 RX ORDER — SCOLOPAMINE TRANSDERMAL SYSTEM 1 MG/1
1 PATCH, EXTENDED RELEASE TRANSDERMAL
Status: DISCONTINUED | OUTPATIENT
Start: 2021-12-15 | End: 2021-12-16 | Stop reason: HOSPADM

## 2021-12-15 RX ORDER — HYDROCODONE BITARTRATE AND ACETAMINOPHEN 10; 325 MG/1; MG/1
1 TABLET ORAL
Qty: 120 TABLET | Refills: 0 | Status: SHIPPED | OUTPATIENT
Start: 2021-12-15 | End: 2021-12-15

## 2021-12-15 RX ORDER — AMOXICILLIN 250 MG
1 CAPSULE ORAL
Status: DISCONTINUED | OUTPATIENT
Start: 2021-12-15 | End: 2021-12-16 | Stop reason: HOSPADM

## 2021-12-15 RX ORDER — OXYCODONE HCL 5 MG/5 ML
5 SOLUTION, ORAL ORAL
Status: COMPLETED | OUTPATIENT
Start: 2021-12-15 | End: 2021-12-15

## 2021-12-15 RX ORDER — OXYCODONE HYDROCHLORIDE 10 MG/1
10 TABLET ORAL
Status: DISCONTINUED | OUTPATIENT
Start: 2021-12-15 | End: 2021-12-15

## 2021-12-15 RX ORDER — TRAMADOL HYDROCHLORIDE 50 MG/1
50 TABLET ORAL EVERY 4 HOURS PRN
Status: DISCONTINUED | OUTPATIENT
Start: 2021-12-15 | End: 2021-12-16 | Stop reason: HOSPADM

## 2021-12-15 RX ORDER — CEFAZOLIN SODIUM 1 G/3ML
INJECTION, POWDER, FOR SOLUTION INTRAMUSCULAR; INTRAVENOUS PRN
Status: DISCONTINUED | OUTPATIENT
Start: 2021-12-15 | End: 2021-12-15 | Stop reason: SURG

## 2021-12-15 RX ORDER — HALOPERIDOL 5 MG/ML
1 INJECTION INTRAMUSCULAR EVERY 6 HOURS PRN
Status: DISCONTINUED | OUTPATIENT
Start: 2021-12-15 | End: 2021-12-16 | Stop reason: HOSPADM

## 2021-12-15 RX ORDER — HYDROMORPHONE HYDROCHLORIDE 1 MG/ML
0.2 INJECTION, SOLUTION INTRAMUSCULAR; INTRAVENOUS; SUBCUTANEOUS
Status: DISCONTINUED | OUTPATIENT
Start: 2021-12-15 | End: 2021-12-15 | Stop reason: HOSPADM

## 2021-12-15 RX ADMIN — HALOPERIDOL LACTATE 1 MG: 5 INJECTION, SOLUTION INTRAMUSCULAR at 15:15

## 2021-12-15 RX ADMIN — ONDANSETRON 4 MG: 2 INJECTION INTRAMUSCULAR; INTRAVENOUS at 15:08

## 2021-12-15 RX ADMIN — TRANEXAMIC ACID 1000 MG: 100 INJECTION, SOLUTION INTRAVENOUS at 14:24

## 2021-12-15 RX ADMIN — CEFAZOLIN 2 G: 330 INJECTION, POWDER, FOR SOLUTION INTRAMUSCULAR; INTRAVENOUS at 13:15

## 2021-12-15 RX ADMIN — ACETAMINOPHEN 1000 MG: 500 TABLET, FILM COATED ORAL at 18:00

## 2021-12-15 RX ADMIN — KETOROLAC TROMETHAMINE 30 MG: 30 INJECTION, SOLUTION INTRAMUSCULAR; INTRAVENOUS at 18:00

## 2021-12-15 RX ADMIN — FENTANYL CITRATE 100 MCG: 50 INJECTION, SOLUTION INTRAMUSCULAR; INTRAVENOUS at 13:30

## 2021-12-15 RX ADMIN — ONDANSETRON 4 MG: 2 INJECTION INTRAMUSCULAR; INTRAVENOUS at 14:25

## 2021-12-15 RX ADMIN — HYDROMORPHONE HYDROCHLORIDE 1 MG: 1 INJECTION, SOLUTION INTRAMUSCULAR; INTRAVENOUS; SUBCUTANEOUS at 23:29

## 2021-12-15 RX ADMIN — SODIUM CHLORIDE, POTASSIUM CHLORIDE, SODIUM LACTATE AND CALCIUM CHLORIDE: 600; 310; 30; 20 INJECTION, SOLUTION INTRAVENOUS at 10:08

## 2021-12-15 RX ADMIN — SODIUM CHLORIDE, POTASSIUM CHLORIDE, SODIUM LACTATE AND CALCIUM CHLORIDE: 600; 310; 30; 20 INJECTION, SOLUTION INTRAVENOUS at 13:06

## 2021-12-15 RX ADMIN — FENTANYL CITRATE 50 MCG: 50 INJECTION, SOLUTION INTRAMUSCULAR; INTRAVENOUS at 13:51

## 2021-12-15 RX ADMIN — FENTANYL CITRATE 25 MCG: 50 INJECTION, SOLUTION INTRAMUSCULAR; INTRAVENOUS at 15:44

## 2021-12-15 RX ADMIN — DEXAMETHASONE SODIUM PHOSPHATE 8 MG: 4 INJECTION, SOLUTION INTRAMUSCULAR; INTRAVENOUS at 14:25

## 2021-12-15 RX ADMIN — MIDAZOLAM HYDROCHLORIDE 2 MG: 1 INJECTION, SOLUTION INTRAMUSCULAR; INTRAVENOUS at 13:06

## 2021-12-15 RX ADMIN — DAPTOMYCIN 490 MG: 350 INJECTION, POWDER, LYOPHILIZED, FOR SOLUTION INTRAVENOUS at 12:57

## 2021-12-15 RX ADMIN — TRANEXAMIC ACID 1000 MG: 100 INJECTION, SOLUTION INTRAVENOUS at 13:15

## 2021-12-15 RX ADMIN — OXYCODONE HYDROCHLORIDE 10 MG: 5 SOLUTION ORAL at 15:07

## 2021-12-15 RX ADMIN — HYDROMORPHONE HYDROCHLORIDE 0.5 MG: 2 INJECTION, SOLUTION INTRAMUSCULAR; INTRAVENOUS; SUBCUTANEOUS at 14:08

## 2021-12-15 RX ADMIN — HYDROMORPHONE HYDROCHLORIDE 0.5 MG: 2 INJECTION, SOLUTION INTRAMUSCULAR; INTRAVENOUS; SUBCUTANEOUS at 14:26

## 2021-12-15 RX ADMIN — KETOROLAC TROMETHAMINE 30 MG: 30 INJECTION, SOLUTION INTRAMUSCULAR; INTRAVENOUS at 23:28

## 2021-12-15 RX ADMIN — BUPIVACAINE HYDROCHLORIDE 20 ML: 2.5 INJECTION, SOLUTION EPIDURAL; INFILTRATION; INTRACAUDAL; PERINEURAL at 12:58

## 2021-12-15 RX ADMIN — HYDROMORPHONE HYDROCHLORIDE 0.5 MG: 2 INJECTION, SOLUTION INTRAMUSCULAR; INTRAVENOUS; SUBCUTANEOUS at 14:38

## 2021-12-15 RX ADMIN — FENTANYL CITRATE 100 MCG: 50 INJECTION, SOLUTION INTRAMUSCULAR; INTRAVENOUS at 13:10

## 2021-12-15 RX ADMIN — ACETAMINOPHEN 1000 MG: 500 TABLET, FILM COATED ORAL at 23:29

## 2021-12-15 RX ADMIN — PROPOFOL 200 MG: 10 INJECTION, EMULSION INTRAVENOUS at 13:10

## 2021-12-15 RX ADMIN — DOCUSATE SODIUM 100 MG: 100 CAPSULE, LIQUID FILLED ORAL at 18:00

## 2021-12-15 RX ADMIN — ONDANSETRON 4 MG: 2 INJECTION INTRAMUSCULAR; INTRAVENOUS at 19:25

## 2021-12-15 RX ADMIN — HYDROMORPHONE HYDROCHLORIDE 0.5 MG: 2 INJECTION, SOLUTION INTRAMUSCULAR; INTRAVENOUS; SUBCUTANEOUS at 14:47

## 2021-12-15 RX ADMIN — HYDROMORPHONE HYDROCHLORIDE 1 MG: 1 INJECTION, SOLUTION INTRAMUSCULAR; INTRAVENOUS; SUBCUTANEOUS at 19:15

## 2021-12-15 ASSESSMENT — COGNITIVE AND FUNCTIONAL STATUS - GENERAL
SUGGESTED CMS G CODE MODIFIER MOBILITY: CK
TOILETING: A LITTLE
PERSONAL GROOMING: A LITTLE
DRESSING REGULAR UPPER BODY CLOTHING: A LITTLE
HELP NEEDED FOR BATHING: A LITTLE
DAILY ACTIVITIY SCORE: 19
MOBILITY SCORE: 18
SUGGESTED CMS G CODE MODIFIER DAILY ACTIVITY: CK
WALKING IN HOSPITAL ROOM: A LITTLE
MOVING TO AND FROM BED TO CHAIR: A LITTLE
STANDING UP FROM CHAIR USING ARMS: A LITTLE
CLIMB 3 TO 5 STEPS WITH RAILING: A LITTLE
DRESSING REGULAR LOWER BODY CLOTHING: A LITTLE
TURNING FROM BACK TO SIDE WHILE IN FLAT BAD: A LITTLE
MOVING FROM LYING ON BACK TO SITTING ON SIDE OF FLAT BED: A LITTLE

## 2021-12-15 ASSESSMENT — PAIN DESCRIPTION - PAIN TYPE
TYPE: SURGICAL PAIN
TYPE: ACUTE PAIN;SURGICAL PAIN
TYPE: SURGICAL PAIN
TYPE: ACUTE PAIN;SURGICAL PAIN
TYPE: SURGICAL PAIN
TYPE: SURGICAL PAIN
TYPE: CHRONIC PAIN
TYPE: SURGICAL PAIN
TYPE: SURGICAL PAIN
TYPE: ACUTE PAIN;SURGICAL PAIN
TYPE: SURGICAL PAIN

## 2021-12-15 ASSESSMENT — PAIN SCALES - GENERAL: PAIN_LEVEL: 3

## 2021-12-15 ASSESSMENT — LIFESTYLE VARIABLES
CONSUMPTION TOTAL: NEGATIVE
ON A TYPICAL DAY WHEN YOU DRINK ALCOHOL HOW MANY DRINKS DO YOU HAVE: 0
EVER FELT BAD OR GUILTY ABOUT YOUR DRINKING: NO
AVERAGE NUMBER OF DAYS PER WEEK YOU HAVE A DRINK CONTAINING ALCOHOL: 0
TOTAL SCORE: 0
EVER HAD A DRINK FIRST THING IN THE MORNING TO STEADY YOUR NERVES TO GET RID OF A HANGOVER: NO
HAVE YOU EVER FELT YOU SHOULD CUT DOWN ON YOUR DRINKING: NO
TOTAL SCORE: 0
HOW MANY TIMES IN THE PAST YEAR HAVE YOU HAD 5 OR MORE DRINKS IN A DAY: 0
TOTAL SCORE: 0
ALCOHOL_USE: NO
HAVE PEOPLE ANNOYED YOU BY CRITICIZING YOUR DRINKING: NO

## 2021-12-15 ASSESSMENT — PATIENT HEALTH QUESTIONNAIRE - PHQ9
2. FEELING DOWN, DEPRESSED, IRRITABLE, OR HOPELESS: NOT AT ALL
SUM OF ALL RESPONSES TO PHQ9 QUESTIONS 1 AND 2: 0
1. LITTLE INTEREST OR PLEASURE IN DOING THINGS: NOT AT ALL

## 2021-12-15 ASSESSMENT — FIBROSIS 4 INDEX: FIB4 SCORE: 0.74

## 2021-12-15 NOTE — ANESTHESIA PROCEDURE NOTES
Peripheral Block    Date/Time: 12/15/2021 12:58 PM  Performed by: Alex Sun M.D.  Authorized by: Alex Sun M.D.     Patient Location:  Pre-op  Start Time:  12/15/2021 12:58 PM  End Time:  12/15/2021 1:00 PM  Reason for Block: at surgeon's request and post-op pain management ONLY    patient identified, IV checked, site marked, risks and benefits discussed, surgical consent, monitors and equipment checked, pre-op evaluation and timeout performed    Patient Position:  Supine  Prep: ChloraPrep    Monitoring:  Heart rate, continuous pulse ox and cardiac monitor  Block Region:  Lower Extremity  Lower Extremity - Block Type:  Selective FEMORAL nerve block at the Adductor Canal    Laterality:  Left  Procedures: ultrasound guided  Image captured, interpreted and electronically stored.  Local Infiltration:  Lidocaine  Strength:  1 %  Dose:  3 ml  Block Type:  Single-shot  Needle Length:  100mm  Needle Gauge:  21 G  Needle Localization:  Ultrasound guidance  Injection Assessment:  Negative aspiration for heme, no paresthesia on injection, incremental injection and local visualized surrounding nerve on ultrasound  Evidence of intravascular injection: No     Ultrasound images saved in chart

## 2021-12-15 NOTE — OR NURSING
1500:  Report rec'd from Clarke YANG.  Pt c/o pain and nausea.  Will plan on giving analgesics.     1515:  Pt still complaining of nausea.      1530: Pt refusing oxycodone as she stated it makes her itch. Refusing pain meds at this time due to nausea.      1545:  Pt states nausea is tolerable and requested pain medication. Analgesia given.     1600: Pt states pain is decreasing, but still painful.  Denies nausea.     1615: Pt states pain is tolerable and is resting comfortably; able to rest.     1625:  Report called to Billy YANG, Meets criteria to transfer to floor. Transferred on O2 tank @ respirations spontaneous and non-laboredIcepack applied over c/d/i left knee surgical dressings.    1630: Pt resting comfortably. No s/sx distress or pain.     1635:  Pt discharged to room.

## 2021-12-15 NOTE — OR SURGEON
Immediate Post OP Note    PreOp Diagnosis: Left knee arthrofibrosis      PostOp Diagnosis: Same      Procedure(s):  POLY EXCHANGE KNEE, PATELLA  AND TIBIAL INSERT SYNOVECTOMY - Wound Class: Clean    Surgeon(s):  HOLLIE Flores M.D.    Anesthesiologist/Type of Anesthesia:  Anesthesiologist: Alex Sun M.D./General    Surgical Staff:  Circulator: Giselle Redd R.N.  Limb Nava: Alex Cooper  Scrub Person: Jay Garcia    Specimens removed if any:  * No specimens in log *    Estimated Blood Loss: 50cc    Findings: Extensive arthrofibrosis left knee    Complications: None        12/15/2021 3:09 PM Misael Daly M.D.

## 2021-12-15 NOTE — ANESTHESIA PROCEDURE NOTES
Airway    Date/Time: 12/15/2021 1:12 PM  Performed by: Alex Sun M.D.  Authorized by: Alex Sun M.D.     Location:  OR  Urgency:  Elective  Indications for Airway Management:  Anesthesia      Spontaneous Ventilation: absent    Sedation Level:  Deep  Preoxygenated: Yes    Final Airway Type:  Supraglottic airway  Final Supraglottic Airway:  Standard LMA    SGA Size:  4  Number of Attempts at Approach:  1

## 2021-12-15 NOTE — OR NURSING
0939: Brought patient back to pre-op and assumed care.  1031: Patient allergies and NPO status verified, home medication reconciliation completed and belongings secured. Patient verbalizes understanding of pain scale, expected course of stay and plan of care. Surgical site verified with patient. IV access established. Sequentials placed on legs.

## 2021-12-15 NOTE — DISCHARGE INSTR - OTHER INFO
Dr. Moreno's Discharge Instructions:  The first week after your joint replacement is a time to recover from the surgery. We expect light exercise to keep you active and mobile. Dr. Moreno requires a walker or cane for most patients in the first few days following surgery to prevent falls or complications.      Most patients are prescribed two medications for pain control: a narcotic such as Oxycodone or Hydrocodone or a milder medication called Tramadol. These can be alternated for pain control, and the priority is to decrease use of the stronger narcotic as soon as tolerated. In addition, you were prescribed Celebrex, an anti-inflammatory medication. Do not take any other types of anti-inflammatories with this medication. Last, you should take acetaminophen, either 500mg every 4 hours, or 1000mg every 8 hours, around the clock. Do not exceed 3000mg in a 24 hour period. The anti-inflammatory and acetaminophen will help reduce the amount of narcotic medication you require.      Take your pain medication as appropriate to ensure that your pain is not limiting your recovery. You'll be seen in clinic in 7-10 days (this appointment has already been made, call our office if you're unsure of the time). At that time, we'll prescribe your physical therapy to help with the recovery phase.      -Keep dressing clean and dry. Leave dressing in place until follow up. If you have an incisional vac dressing, the battery may die before your first post operative appointment, but you can leave the surgical dressing in place and it will be removed at your clinic visit. The battery of the dressing may die, and the sponge will inflate because it is no longer holding suction. You can still leave the dressing in place and it will be removed at the office. Call the office if you notice drainage from the surgical dressing.     -OK to shower, keep dressing in place. Pat dressing dry, do not rub incision     -Do not soak incision in bath, hot tub  or pool     -Follow up with Dr. Moreno at regularly scheduled time     -Call [place] office at [phone] for questions     -Weight bearing status: as tolerated     -Take aspirin 81mg twice daily  for 1 month to prevent blood clots

## 2021-12-15 NOTE — LETTER
November 12, 2021    Patient Name: Lesley Hernandez  Surgeon Name: Ruiz Moreno M.D.  Surgery Facility: [facility] ([address] [address] [facility])  Surgery Date: 12/15/2021    The time of your surgery is not final and may change up to and until the day of your surgery. You will be contacted 24-48 hours prior to your surgery date with your check-in and surgery time.    If you will not be at one of the below numbers please call/text the surgery scheduler at [phone]  Preferred Phone: [phone]    BEFORE YOUR SURGERY  Pre Registration and/or Lab Work must be done within and no earlier than 28 days prior to your surgery date. Please call [facility] at [phone] for an appointment as soon as possible.       Please refrain from smoking any substance after midnight prior to surgery. Smoking may interfere with the anesthetic and frequently produces nausea during the recovery period.    Continue taking all lifesaving medications. Including the morning of your surgery with small sip of water.    Please read the MEDICATION INSTRUCTIONS below completely.    DAY OF YOUR SURGERY  Nothing to eat or drink after midnight     Please arrive at the hospital/surgery center at the check-in time provided.     An adult will need to bring you and take you home after your surgery.     AFTER YOUR SURGERY  Post op Appointment:   Date: 1/03/2022   Time: 11:00am   With: Ruiz Moreno M.D.   Location: [address] [place] [id number]    - Therapy- Your first appointment should be 1  week(s) after your surgery. For your convenience we have 4 Physical Therapy locations: [facility], [place], and [facility]. Call our office ASAP to schedule an appointment at [phone] or take the enclosed Therapy Prescription to a facility of your choice.  - No dental work for 3-6 months after your surgery.    TIME OFF WORK  FMLA or Disability forms can be faxed directly to:  [phone] or you may drop them off at [address] [place] [address]. Our office charges a $35.00 fee per form. Forms will be completed within 10 business days of drop off and payment received. For the status of your forms you may contact our disability office directly [phone].    MEDICATION INSTRUCTIONS  The following medications should be stopped a minimum of 10 days prior to surgery:  All over the counter, Supplements & Herbal medications    Anorectics: Phentermine (Adipex-P, Lomaira and Suprenza), Phentermine-topiramate (Qsymia), Bupropion-naltrexone (Contrave)    Opiod Partial Agonists/Opioid Antagonists: Buprenorphine (Subocone, Belbuca, Butrans, Probuphine Implant, Sublocade), Naltrexone (ReVia, Vivitrol), Naloxone    Amphetamines: Dextroamphetamine/Amphetamine (Adderall, Mydayis), Methylphenidate Hydrochloride (Concerta, Metadate, Methylin, Ritalin)    The following medications should be stopped 5 days prior to surgery:  Blood Thinners: Any Aspirin, Aspirin products, anti-inflammatories such as ibuprofen and any blood thinners such as Coumadin and Plavix. Please consult your prescribing physician if you are on life saving blood thinners, in regards to when to stop medications prior to surgery.     The following medications should be stopped a minimum of 3 days prior to surgery:  PDE-5 inhibitors: Sildenafil (Viagra), Tadalafil (Cialis), Vardenafil (Levitra), Avanafil (Stendra)    MAO Inhibitors: Rasagiline (Azilect), Selegiline (Eldepryl, Emsam, Selapar), Isocarboxazid (Marplan), Phenelzine (Nardil)

## 2021-12-15 NOTE — ANESTHESIA POSTPROCEDURE EVALUATION
Patient: Lesley Hernandez    Procedure Summary     Date: 12/15/21 Room / Location:  OR  / SURGERY [facility]    Anesthesia Start: 1306 Anesthesia Stop: 1454    Procedure: REVISION, TOTAL ARTHROPLASTY, KNEE, PATELLA  AND TIBIAL INSERT (Left Knee) Diagnosis:       Failed total knee arthroplasty, initial encounter ([facility])      (failed total knee arthroplasty)    Surgeons: Ruiz Moreno M.D. Responsible Provider: Alex Sun M.D.    Anesthesia Type: general, peripheral nerve block ASA Status: 2          Final Anesthesia Type: general, peripheral nerve block  Last vitals  BP   Blood Pressure: 140/91    Temp   36.3 °C (97.3 °F)    Pulse   64   Resp   16    SpO2   98 %      Anesthesia Post Evaluation    Patient location during evaluation: PACU  Patient participation: complete - patient participated  Level of consciousness: awake and alert  Pain score: 3    Airway patency: patent  Anesthetic complications: no  Cardiovascular status: hemodynamically stable  Respiratory status: acceptable  Hydration status: euvolemic    PONV: none          No complications documented.     Nurse Pain Score: 7 (NPRS)

## 2021-12-15 NOTE — ANESTHESIA PREPROCEDURE EVALUATION
Case: [id number] Date/Time: 12/15/21 1145    Procedure: REVISION, TOTAL ARTHROPLASTY, KNEE, PATELLA  AND TIBIAL INSERT (Left Knee)    Anesthesia type: General    Diagnosis: Failed total knee arthroplasty, initial encounter ([facility]) [T84.018A, Z96.659]    Pre-op diagnosis: Failed total knee arthroplasty, initial encounter ([facility]) [T84.018A, Z96.659]    Location: [place] [address] / SURGERY [facility]    Surgeons: Ruiz Moreno M.D.          Relevant Problems   No relevant active problems       Physical Exam    Airway   Mallampati: II  TM distance: >3 FB  Neck ROM: full       Cardiovascular - normal exam  Rhythm: regular  Rate: normal  (-) murmur     Dental - normal exam           Pulmonary - normal exam  Breath sounds clear to auscultation     Abdominal    Neurological - normal exam                 Anesthesia Plan    ASA 2       Plan - general and peripheral nerve block     Peripheral nerve block will be post-op pain control  Airway plan will be LMA          Induction: intravenous    Postoperative Plan: Postoperative administration of opioids is intended.    Pertinent diagnostic labs and testing reviewed    Informed Consent:    Anesthetic plan and risks discussed with patient.    Use of blood products discussed with: patient whom consented to blood products.
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
## 2021-12-15 NOTE — OR NURSING
1452- To PACU from OR via bed. Drowsy, respirations spontaneous and non-labored via OPA. Icepack applied over c/d/i left knee surgical dressings.Left pedal pulse +2. LLE cap refill 2 seconds. SCDs on. LR infusing via PIV.  1500- Report given to TREY Davis.

## 2021-12-15 NOTE — ANESTHESIA TIME REPORT
Anesthesia Start and Stop Event Times     Date Time Event    12/15/2021 1134 Ready for Procedure     1306 Anesthesia Start     1454 Anesthesia Stop        Responsible Staff  12/15/21    Name Role Begin End    Alex Sun M.D. Anesth 1306 1454        Preop Diagnosis (Free Text):  Pre-op Diagnosis     Failed total knee arthroplasty, initial encounter (HCC) [T84.018A, Z96.659]        Preop Diagnosis (Codes):  Diagnosis Information     Diagnosis Code(s): Failed total knee arthroplasty, initial encounter (HCC) [T84.018A, Z96.659]        Premium Reason  Non-Premium    Comments: KNEE Revision Adductor BLock in PREOP JENNIFER

## 2021-12-16 VITALS
WEIGHT: 179.01 LBS | OXYGEN SATURATION: 94 % | DIASTOLIC BLOOD PRESSURE: 55 MMHG | BODY MASS INDEX: 28.1 KG/M2 | RESPIRATION RATE: 18 BRPM | TEMPERATURE: 98 F | HEIGHT: 67 IN | HEART RATE: 64 BPM | SYSTOLIC BLOOD PRESSURE: 105 MMHG

## 2021-12-16 PROCEDURE — 97535 SELF CARE MNGMENT TRAINING: CPT

## 2021-12-16 PROCEDURE — 700111 HCHG RX REV CODE 636 W/ 250 OVERRIDE (IP): Performed by: PHYSICIAN ASSISTANT

## 2021-12-16 PROCEDURE — 700111 HCHG RX REV CODE 636 W/ 250 OVERRIDE (IP): Performed by: STUDENT IN AN ORGANIZED HEALTH CARE EDUCATION/TRAINING PROGRAM

## 2021-12-16 PROCEDURE — 96376 TX/PRO/DX INJ SAME DRUG ADON: CPT

## 2021-12-16 PROCEDURE — A9270 NON-COVERED ITEM OR SERVICE: HCPCS | Performed by: STUDENT IN AN ORGANIZED HEALTH CARE EDUCATION/TRAINING PROGRAM

## 2021-12-16 PROCEDURE — 96375 TX/PRO/DX INJ NEW DRUG ADDON: CPT

## 2021-12-16 PROCEDURE — G0378 HOSPITAL OBSERVATION PER HR: HCPCS

## 2021-12-16 PROCEDURE — 97161 PT EVAL LOW COMPLEX 20 MIN: CPT

## 2021-12-16 PROCEDURE — 97165 OT EVAL LOW COMPLEX 30 MIN: CPT

## 2021-12-16 PROCEDURE — 700102 HCHG RX REV CODE 250 W/ 637 OVERRIDE(OP): Performed by: STUDENT IN AN ORGANIZED HEALTH CARE EDUCATION/TRAINING PROGRAM

## 2021-12-16 RX ADMIN — ACETAMINOPHEN 1000 MG: 500 TABLET, FILM COATED ORAL at 07:26

## 2021-12-16 RX ADMIN — ASPIRIN 81 MG: 81 TABLET, COATED ORAL at 04:13

## 2021-12-16 RX ADMIN — ONDANSETRON 4 MG: 2 INJECTION INTRAMUSCULAR; INTRAVENOUS at 04:20

## 2021-12-16 RX ADMIN — DOCUSATE SODIUM 100 MG: 100 CAPSULE, LIQUID FILLED ORAL at 07:26

## 2021-12-16 RX ADMIN — DEXAMETHASONE SODIUM PHOSPHATE 10 MG: 4 INJECTION, SOLUTION INTRA-ARTICULAR; INTRALESIONAL; INTRAMUSCULAR; INTRAVENOUS; SOFT TISSUE at 07:26

## 2021-12-16 RX ADMIN — HYDROMORPHONE HYDROCHLORIDE 1 MG: 1 INJECTION, SOLUTION INTRAMUSCULAR; INTRAVENOUS; SUBCUTANEOUS at 04:14

## 2021-12-16 RX ADMIN — KETOROLAC TROMETHAMINE 30 MG: 30 INJECTION, SOLUTION INTRAMUSCULAR; INTRAVENOUS at 07:26

## 2021-12-16 RX ADMIN — HYDROMORPHONE HYDROCHLORIDE 1 MG: 1 INJECTION, SOLUTION INTRAMUSCULAR; INTRAVENOUS; SUBCUTANEOUS at 07:26

## 2021-12-16 ASSESSMENT — GAIT ASSESSMENTS
GAIT LEVEL OF ASSIST: SUPERVISED
DISTANCE (FEET): 150
DEVIATION: ANTALGIC
ASSISTIVE DEVICE: FRONT WHEEL WALKER

## 2021-12-16 ASSESSMENT — COGNITIVE AND FUNCTIONAL STATUS - GENERAL
SUGGESTED CMS G CODE MODIFIER MOBILITY: CI
DAILY ACTIVITIY SCORE: 23
HELP NEEDED FOR BATHING: A LITTLE
MOBILITY SCORE: 23
CLIMB 3 TO 5 STEPS WITH RAILING: A LITTLE
SUGGESTED CMS G CODE MODIFIER DAILY ACTIVITY: CI

## 2021-12-16 ASSESSMENT — PAIN DESCRIPTION - PAIN TYPE
TYPE: ACUTE PAIN;SURGICAL PAIN
TYPE: ACUTE PAIN;SURGICAL PAIN

## 2021-12-16 ASSESSMENT — ACTIVITIES OF DAILY LIVING (ADL): TOILETING: INDEPENDENT

## 2021-12-16 ASSESSMENT — PATIENT HEALTH QUESTIONNAIRE - PHQ9
SUM OF ALL RESPONSES TO PHQ9 QUESTIONS 1 AND 2: 0
1. LITTLE INTEREST OR PLEASURE IN DOING THINGS: NOT AT ALL
2. FEELING DOWN, DEPRESSED, IRRITABLE, OR HOPELESS: NOT AT ALL

## 2021-12-16 NOTE — DISCHARGE PLANNING
Received Choice form at 4998  Agency/Facility Name: [facility]  Referral sent per Choice form @ 3198

## 2021-12-16 NOTE — OP REPORT
DATE OF SERVICE:  12/15/2021     PREOPERATIVE DIAGNOSIS:  Failed (arthrofibrosis) revision left total knee   arthroplasty.     POSTOPERATIVE DIAGNOSIS:  Failed (arthrofibrosis) revision left total knee   arthroplasty.     PROCEDURE:  Revision left total knee arthroplasty.     SURGEON:  Ruiz Moreno MD     ASSISTANT:  Misael Daly MD     ANESTHESIA:  General.     COMPLICATIONS:  None.     INDICATIONS:  The patient is a 61-year-old woman who had undergone multiple   revision left total knee arthroplasty procedures for treatment of chronic   stiffness and arthrofibrosis and developed recurrent pain and limited motion   despite surgical and appropriate pain management and physical therapy.  The   patient was admitted for elective revision of left total knee arthroplasty and   arthrolysis.     DESCRIPTION OF PROCEDURE:  After induction of satisfactory anesthesia, the   patient was positioned supine on the operating room table and a tourniquet   applied to proximal left thigh.  Intravenous antibiotics were administered and   after the usual prep and drape, the limb was exsanguinated with an Esmarch   and tourniquet inflated to 250 mmHg.     An anteromedial incision was made through the prior anterior knee scar, which   was carried down through skin and subcutaneous tissue to the level of the   extensor mechanism where full thickness skin and subcutaneous tissue flaps   were raised medially and laterally, and a medial parapatellar arthrotomy   performed.  Considerable scar tissue was identified within the knee and   retropatellar adhesions limiting exposure.  The proximal medial soft tissue   sleeve was elevated in a subperiosteal fashion to the mid coronal plane and a   synovectomy of the medial and lateral gutters and suprapatellar pouch was   performed.  The synovium was found to be thickened and arthrofibrotic, but   there was no purulent fluid or gross evidence of infection seen.  The lateral   retinaculum  was released to further mobilize the extensor mechanism and   heterotopic bone identified around the patella.  The knee was flexed _____   that the tibial insert would be removed, which was.  This allowed greater   access to the lateral gutter where the lateral retinaculum and lateral   capsular attachment on to the tibia was released, taking care to preserve the   integrity of the extensor mechanism.  In order to allow greater flexion, the   patellar component was revised to decrease overall thickness of the patella   potentially and debride arthrofibrotic scar and heterotopic bone from the   margins and lateral facet.  In order to accomplish this, the patella was held   firmly with towel clips and patellar component removed with an oscillating saw   after taking caliper measurements of the composite thickness of the bone in 4   quadrants to identify the appropriate plane for resection of the patella.    The patellar component was removed in this fashion and the underlying pegs   were removed with a high-speed bur.  A lateral facetectomy was performed and   lateral retinaculum found to be thickened and arthrofibrotic, which was   excised, taking care to preserve the integrity of the subcutaneous and skin   soft tissue flap over the lateral retinaculum.  The knee was then flexed to   allow exposure of the posterior compartment by distracting the femur from the   tibia and flexion and a posterior capsular release performed with Bovie   cautery, and synovium and scar tissue removed from the posterior compartment   as much as was accessible with the femoral and tibial components in place.    The femoral and tibial components were carefully examined and found to be well   fixed and in good position, and retained.  A thorough debridement of the   medial and lateral gutters was performed of scar tissue in an effort to   further mobilize the knee and following the wide arthrolysis as described, the   knee was checked with  a series of trial inserts and a 9 mm insert selected,   which was 2 mm thinner than the previously implanted insert in an effort to   allow full extension and greater flexion of the knee.  The baseplate was   irrigated and the prosthetic semi-constrained 9 mm insert placed and locked   into the tibial baseplate locking mechanism.  This was checked manually and   visually to ensure that the liner locking mechanism was fully and properly   engaged.  The knee was brought into extension to expose the patella, which was   then prepared for implantation of a 35 mm thin component in an effort to   decrease composite patellar height and increase excursion of the extensor   mechanism.  This fit in the previously implanted peg holes, which were   prepared for cement by irrigation and lavage.  Cement was mixed and applied to   the exposed tibial bone surface followed by placement of a 35 mm resurfacing   round component, thin, which was placed in the previously implanted peg holes   that had been burred and held firmly in position until the cement had   completely set and excess cement removed from the edges of the component.  A   bur was used to remove any residual peripheral ectopic bone from around the   patella as well as arthrofibrotic scar, taking care to preserve the integrity   of the extensor mechanism.  The tourniquet was released and adequate   hemostasis maintained with electrocautery.  With the final implants in place,   good overall limb alignment was confirmed from hip to ankle as well as motion   0-125 degrees with central patellar tracking throughout the arc of motion and   excellent stability and soft tissue balance to varus and valgus and AP stress   and extension, mid flexion, and full flexion and throughout the arc of motion.    The wound was irrigated with dilute Betadine followed by pulsatile saline   lavage and the medial retinacular arthrotomy closed with a running #1 Quill   suture followed by fully  flexing the knee to ensure the integrity of the   retinacular repair.  The subcutaneous layer was closed with interrupted 2-0   Vicryl sutures and skin closed with a running 3-0 Monocryl suture, application   of skin glue and a sterile dressing.  The patient tolerated the procedure   well and returned to recovery room in stable condition.        ______________________________  MD SARINA Sainz/DERECK/ARJUN    DD:  12/15/2021 14:32  DT:  12/15/2021 15:59    Job#:  [id number]

## 2021-12-16 NOTE — THERAPY
Physical Therapy   Initial Evaluation     Patient Name: Lesley Hernandez  Age:  61 y.o., Sex:  female  Medical Record #: [id number]  Today's Date: 12/16/2021     Precautions  Precautions: (P) No Weight Bearing Restrictions Left Lower Extremity    Assessment  Patient is 61 y.o. female who was seen s/p revision of L TKA. Pt was agreeable to therapy evaluation for safe d/c planning. Pt was provided with education and practice with supine therapeutic exercises, education on elevation, icing, and positioning. Pt provided with extensive education on positioning of L LE in extension to avoid scar tissue build up along with education on limiting activity. Pt states she [place] over exert [place] Pt encouraged to ask for assist upon d/c to home and provided with activity limitations to avoid delayed healing to L TKA. Pt was able to verbalize a good understanding of activity limitations and to take it slow. Pt is in on acute skilled PT needs this time. Recommend outpatient physical therapy services to address higher level deficits.    Plan    Recommend Physical Therapy for Evaluation only     DC Equipment Recommendations: (P) Front-Wheel Walker  Discharge Recommendations: (P) Recommend outpatient physical therapy services to address higher level deficits    Objective       12/16/21 0910   Initial Contact Note    Initial Contact Note Order Received and Verified, Evaluation Only - Patient Does Not Require Further Acute Physical Therapy at this Time.  However, May Benefit from Post Acute Therapy for Higher Level Functional Deficits.   Precautions   Precautions No Weight Bearing Restrictions Left Lower Extremity   Pain 0 - 10 Group   Location Knee   Location Orientation Left   Description Aching;Constant   Therapist Pain Assessment Prior to Activity;During Activity;Post Activity;Nurse Notified;3   Prior Living Situation   Prior Services None   Housing / Facility [facility]   Steps Into  Home 1   Steps In Home 0   Equipment Owned None   Lives with - Patient's Self Care Capacity Adult Children   Comments pt states son will be able to assist upon d/c to home    Prior Level of Functional Mobility   Bed Mobility Independent   Transfer Status Independent   Ambulation Independent   Distance Ambulation (Feet)   (community )   Assistive Devices Used None   Stairs Independent   History of Falls   History of Falls No   Cognition    Cognition / Consciousness WDL   Level of Consciousness Alert   Comments pleasant/cooperative   Passive ROM Lower Body   Passive ROM Lower Body X   Comments limited due to pain; able to demo -2 to 80 deg of L knee ROM   Active ROM Lower Body    Active ROM Lower Body  X   Comments limited due to pain; able to demo -3 to 75 deg of L knee ROM   Strength Lower Body   Lower Body Strength  X   Comments limited due to pain; able to demo functional strength for B LE   Sensation Lower Body   Lower Extremity Sensation   WDL   Lower Body Muscle Tone   Lower Body Muscle Tone  WDL   Strength Upper Body   Upper Body Strength  WDL   Upper Body Muscle Tone   Upper Body Muscle Tone  WDL   Neurological Concerns   Neurological Concerns No   Coordination Lower Body    Coordination Lower Body  WDL   Balance Assessment   Sitting Balance (Static) Good   Sitting Balance (Dynamic) Fair +   Standing Balance (Static) Good   Standing Balance (Dynamic) Fair +   Weight Shift Sitting Good   Weight Shift Standing Fair   Comments w/fww; no tasneem LOB noted   Gait Analysis   Gait Level Of Assist Supervised   Assistive Device Front Wheel Walker   Distance (Feet) 150   # of Times Distance was Traveled 1   Deviation Antalgic   # of Stairs Climbed 4   Level of Assist with Stairs Supervised   Weight Bearing Status none noted    Comments cues for heel to toe mechanics; and demonstration for navigation of stairs; able to return demo safe mechanics   Bed Mobility    Supine to Sit Supervised   Sit to Supine Supervised    Scooting Supervised   Rolling Supervised   Functional Mobility   Sit to Stand Supervised   Bed, Chair, Wheelchair Transfer Supervised   Transfer Method Stand Step   Mobility EOB, sit<>stand, ambulation, stairs, back to bed    How much difficulty does the patient currently have...   Turning over in bed (including adjusting bedclothes, sheets and blankets)? 4   Sitting down on and standing up from a chair with arms (e.g., wheelchair, bedside commode, etc.) 4   Moving from lying on back to sitting on the side of the bed? 4   How much help from another person does the patient currently need...   Moving to and from a bed to a chair (including a wheelchair)? 4   Need to walk in a hospital room? 4   Climbing 3-5 steps with a railing? 3   6 clicks Mobility Score 23   Activity Tolerance   Sitting in Chair NT   Sitting Edge of Bed 5 mins   Standing 10 mins   Comments no adverse events noted    Edema / Skin Assessment   Edema / Skin  Not Assessed   Education Group   Education Provided Role of Physical Therapist   Role of Physical Therapist Patient Response Patient;Acceptance;Demonstration;Explanation;Verbal Demonstration;Action Demonstration   Additional Comments extensive education on positioning of L LE in extension to avoid scar tissue    Anticipated Discharge Equipment and Recommendations   DC Equipment Recommendations Front-Wheel Walker   Discharge Recommendations Recommend outpatient physical therapy services to address higher level deficits   Interdisciplinary Plan of Care Collaboration   IDT Collaboration with  Nursing   Patient Position at End of Therapy In Bed;Call Light within Reach;Tray Table within Reach;Phone within Reach   Collaboration Comments aware of visit and recs    Session Information   Date / Session Number  12/16 eval only

## 2021-12-16 NOTE — CARE PLAN
"The patient is Watcher - Medium risk of patient condition declining or worsening     Goals: patient will ambulate at least 50 feet, state pain is well controled         Patient is not progressing towards the following goals: not progressing. Patient state nothing else helps but dilaudid. Patient states \"I do not understand why I need to take oxy it makes me itch, I need dilaudid, I need to get ahead of my pain, you don't understand\"  Patient educated on medication administration. MD notified. New orders received.      Problem: Pain - Standard  Goal: Alleviation of pain or a reduction in pain to the patient’s comfort goal  Outcome: Not Progressing     Problem: Knowledge Deficit - Standard  Goal: Patient and family/care givers will demonstrate understanding of plan of care, disease process/condition, diagnostic tests and medications  Outcome: Not Progressing     "

## 2021-12-16 NOTE — PROGRESS NOTES
4 Eyes Skin Assessment Completed by TREY Ortiz and TREY Little.    Head WDL  Ears WDL  Nose WDL  Mouth WDL  Neck WDL  Breast/Chest WDL  Shoulder Blades WDL  Spine WDL  (R) Arm/Elbow/Hand WDL  (L) Arm/Elbow/Hand WDL  Abdomen WDL  Groin WDL  Scrotum/Coccyx/Buttocks WDL  (R) Leg WDL  (L) Leg: surgical dressing/Adarsh to L-knee, CDI   (R) Heel/Foot/Toe WDL  (L) Heel/Foot/Toe WDL      Devices In Places Pulse Ox and oxygen     Interventions In Place N/A    Possible Skin Injury No    Pictures Uploaded Into Epic N/A  Wound Consult Placed N/A  RN Wound Prevention Protocol Ordered No

## 2021-12-16 NOTE — CARE PLAN
The patient is Watcher - Medium risk of patient condition declining or worsening    Shift Goals  Clinical Goals: patient will tolerate pt and ot, will state pain is well controled, will state understanding of discharge papers   Patient Goals: Ambulate in hallway x3 times/day     Progress made toward(s) clinical / shift goals:  progressing

## 2021-12-16 NOTE — PROGRESS NOTES
Received report from day shift nurse.   Assumed pt care   Pt is A&Ox4, resting comfortably bed with family at bedside  Pt on 2L of oxygen via nasal cannula.  No signs of SOB/respiratory distress.   Prn Dilaudid 1mg given per MAR for c/o pain to surgical site/L-knee  Assessment completed, Labs noted, VSS.   Surgical dressing with Adarsh dressing to L-knee in place CDI, +CMS, + pulse, able to wiggle toes and dorsi/plantar flex.  Denied any numbness/tingling  Polar ice & SCDs on.    Educated pt the importance to call for assistance.   Fall precautions in place.   Call light and personal belongings within reach.   Continue to monitor.

## 2021-12-16 NOTE — PROGRESS NOTES
Patient to the floor via bed on 2L o2 via NC. Breathing unlabored. Updated on plan of care. Patient states understanding. Dressing CDI. Assessment done. Bed low and locked, call light within the reach.

## 2021-12-16 NOTE — DISCHARGE INSTRUCTIONS
Discharge Instructions    Discharged to home by car with relative. Discharged via wheelchair, hospital escort: Yes.  Special equipment needed: Walker    Be sure to schedule a follow-up appointment with your primary care doctor or any specialists as instructed.     Discharge Plan:   Diet Plan: Discussed  Activity Level: Discussed  Confirmed Follow up Appointment: Patient to Call and Schedule Appointment  Confirmed Symptoms Management: Discussed  Medication Reconciliation Updated: Yes  Influenza Vaccine Indication: Patient Refuses    I understand that a diet low in cholesterol, fat, and sodium is recommended for good health. Unless I have been given specific instructions below for another diet, I accept this instruction as my diet prescription.   Other diet: Home diet    Special Instructions: Discharge instructions for the Orthopedic Patient    Follow up with Primary Care Physician within 2 weeks of discharge to home, regarding:  Review of medications and diagnostic testing.  Surveillance for medical complications.  Workup and treatment of osteoporosis, if appropriate.     -Is this a Hip/Knee/Shoulder Joint Replacement patient? Yes   TOTAL KNEE REPLACEMENT, AFTER-CARE GUIDELINES     These instructions provide you with information on caring for yourself and your knee after surgery. Your health care provider may also give you instructions that are more specific. Your treatment was planned and performed according to current medical practices but problems sometimes occur. Call your health care provider if you have any problems or questions.     WHAT TO EXPECT AFTER THE PROCEDURE   After your procedure, your knee will typically be stiff, sore, and bruised. This will improve over time.     Pain   · Follow your home pain management plan as discussed with your nurse and as directed by your provider.   · It is important to follow any scheduled pain medications for maximal pain relief.   · If prescribed opioid medication, the  goal is to use opioids only as needed and to wean off prescription pain medicine as soon as possible.   · Ice can be used for pain control.   · Put ice in a plastic bag.   · Place a towel between your skin and the bag.   · Leave the ice on for 20 minutes, 2-3 times a day at a minimum.   · Most patients are off the pain pills by 3 weeks. If your pain continues to be severe, follow up with your provider.     Infection   Knee joint infections occur in fewer than 2% of patients. The most common causes of infection following total knee replacement surgery are from bacteria that enter the bloodstream during dental procedures, urinary tract infections, or skin infections. These bacteria can lodge around your knee replacement and cause an infection.   · Keep the incision as clean and dry as possible.   · Always wash your hands before touching your incision.   · Avoid dental care for 3 months after surgery. Your provider may recommend taking a dose of antibiotics an hour prior to any dental procedure. After 2 years, most providers recommend antibiotics only before an extensive procedure. Ask your provider what they recommend.   · Signs and symptoms of infection include low-grade fever, redness, pain, swelling and drainage from your incision. Notify your provider IMMEDIATELY if you develop ANY of these symptoms.     Post op Disturbances   · Bowel Habits - Constipation is extremely common and caused by a combination of anesthesia, lack of mobility, dehydration and pain medicine. Use stool softeners or laxatives if necessary. It is important not to ignore this problem as bowel obstructions can be a serious complication after joint replacement surgery.   · Mood/Energy Level - Many patients experience a lack of energy and endurance for up to 2-3 months after surgery. Some people feel down and can even become depressed. This is likely due to postoperative anemia, change in activity level, lack of sleep, pain medicine and just the  emotional reaction to the surgery itself that is a big disruption in a person’s life. This usually passes. If symptoms persist, follow up with your primary care provider.  · Returning to Work - Your provider will give you specific instructions based on your profession. Generally, if you work a sedentary job requiring little standing or walking, most patients may return within 2-6 weeks. Manual labor jobs involving walking, lifting and standing may take 3-4 months. Your provider’s office can provide a release to part-time or light duty work early on in your recovery and progress you to full duty as able.   · Driving - You can begin driving once cleared by your provider, provided you are no longer taking narcotic pain medication or any other medications that impair driving. Discuss the length of time expected with your provider as returning to driving depends on things such as your vehicle, which knee was replaced (right or left), and knee motion, strength and reflexes returning appropriately.   · Avoiding falls - A fall during the first few weeks after surgery can damage your new knee and may result in a need for further surgery.  throw rugs and tack down loose carpeting. Be aware of floor hazards such as pets, small objects or uneven surfaces. Notify your provider of any falls.   · Airport Metal Detectors - The sensitivity of metal detectors varies and it is likely that your prosthesis will cause an alarm. Inform the  of your artificial joint.     Diet   · Resume your normal diet as tolerated.   · It is important to achieve a healthy nutritional status by eating a well-balanced diet on a regular basis.   · Your provider may recommend that you take iron and vitamin supplements.   · Continue to drink plenty of fluids.     Shower/Bathing   · You may shower as soon as you get home from the hospital unless otherwise instructed.   · Keep your incision out of water to prevent infection. To keep the  incision dry when showering, cover it with a plastic bag or plastic wrap. If your bandage is waterproof, this may not be necessary. o Pat incision dry if it gets wet. Do not rub. Notify your provider.   · Do not submerge in a bath until cleared by your provider. Your staples must be out and the incision completely healed.     Dressing Change: Only change your dressing if directed by your provider.   · Wash hands.   · Open all dressing change materials.   · Remove old dressing and discard.   · Inspect incision for signs of irritation or infection including redness, increase in clear drainage, yellow/green drainage, odor and surrounding skin hot to touch. Notify your provider if present.   ·  the new dressing by one corner and lay over the incision. Be careful not to touch the inside of the dressing that will lay over the incision.   · Secure in place as instructed. Swelling/Bruising   · Swelling is normal after knee replacement and can involve the thigh, knee, calf and foot.   · Swelling can last from 3-6 months.   · To reduce swelling, elevate your leg higher than your heart while reclining. The first week you are home you should elevate your leg an equal amount of time as you are active.   · The swelling is usually worse after you go home since you are upright for longer periods of time.   · Bruising often does not appear until after you arrive home and can be quite dramatic- appearing purple, black, or green. Bruising is typically not concerning and will subside without any treatment.     Blood Clot Prevention   Your treatment plan includes multiple preventative measures to decrease the risk of blood clots in the legs (DVTs) and the less common, but serious, clots that travel to the lungs (pulmonary emboli). Most patients are at standard risk for them, but people who are at higher risk include those who have had previous clots, a family history of clotting, smoking, diabetes, obesity, advanced age, use  estrogen and/or live a sedentary lifestyle.     · Signs of blood clots in legs include - Swelling in thigh, calf or ankle that does not go down with elevation. Pain, heat and tenderness in calf, back of calf or groin area. NOTE: blood clots can occur in either leg.   · Signs of blood clots in lungs include - Sudden increased shortness of breath, sudden onset of chest pain, and localized chest pain with coughing.   · If you experience any of the above symptoms, notify your provider and seek medical attention immediately.   · You received anticoagulant therapy (blood thinners) in the hospital. Continue the prescribed blood-thinning medication at home, as directed by your provider.   · Your risk for developing a clot continues for up to 2-3 months after surgery. Avoid prolonged sitting and dehydration (long air trips and car trips). If you do take a trip during this time, please get up, move around every 1-1.5 hours, and discuss all travel plans with your provider.     Activity   Once home, stay active. The key is not to overdo it. While you can expect some good days and some bad days, you should notice a gradual improvement and a gradual increase in your endurance over the next 6 to 12 months. Exercise is a critical component of recovery, particularly during the first few weeks after surgery.     · Normal activities of daily living - Expect to resume most within 3 to 6 weeks following surgery. Some pain with activity and at night is common for several weeks after surgery. Walk as much as you like once your doctor gives permission to proceed, but remember that walking is no substitute for the exercises your doctor and physical therapist prescribe. Use a walker, crutches or cane to assist with walking until you can walk smoothly (minimal or no limp) without assistance.   · Physical Therapy Exercises - Follow your home exercise program as instructed by your physical therapist during your hospital stay. Call and set up  outpatient physical therapy appointments per your provider’s recommendations. Physical therapy after the hospital stay focuses on increasing your range of motion, strengthening your muscles and improving your gait/walking pattern. Contact your provider for the referral to outpatient physical therapy if you have not yet received this. -   · Riding a stationary bicycle can help maintain muscle tone and keep your knee flexible. Begin stationary bicycling as directed by your physical therapist or provider.   · Sexual Activity - Your provider can tell you when it safe to resume sexual activity.   · Sleeping Positions - You can safely sleep on your back, on either side, or on your stomach.   · Other Activities - Lower impact activities are preferred. Consult your provider if you have specific questions.     When to Call the Doctor   Call the provider if you experience:   · Fever over 100.5° F   · Increased pain, drainage, redness, odor or heat around the incision area   · Shaking chills   · Increased knee pain with activity and rest   · Increased pain in calf, tenderness or redness above or below the knee   · Increased swelling of calf, ankle, foot   · Sudden increased shortness of breath, sudden onset of chest pain, localized chest pain with coughing   · Incision opening   Or, if there are any questions or concerns about medications or care.     Infection statistic resource:   https://www.[employer]You/contents/prosthetic-joint-infection-epidemiology-microbiology-clinical-manifestations-and-diagnosis     -Is this patient being discharged with medication to prevent blood clots?  Yes, Aspirin Aspirin, ASA oral tablets  What is this medicine?  ASPIRIN (AS pir in) is a pain reliever. It is used to treat mild pain and fever. This medicine is also used as directed by a doctor to prevent and to treat heart attacks, to prevent strokes and blood clots, and to treat arthritis or inflammation.  This medicine may be used for other  purposes; ask your health care provider or pharmacist if you have questions.  COMMON BRAND NAME(S): Aspir-Low, Aspir-Luisa, Aspirtab, Bryn Advanced Aspirin, Bryn Aspirin, Bryn Aspirin Extra Strength, Bryn Aspirin Plus, Bryn Extra Strength, Bryn Extra Strength Plus, Bryn Genuine Aspirin, Bryn Womens Aspirin, Bufferin, Bufferin Extra Strength, Bufferin Low Dose  What should I tell my health care provider before I take this medicine?  They need to know if you have any of these conditions:  · anemia  · asthma  · bleeding problems  · child with chickenpox, the flu, or other viral infection  · diabetes  · gout  · if you frequently drink alcohol containing drinks  · kidney disease  · liver disease  · low level of vitamin K  · lupus  · smoke tobacco  · stomach ulcers or other problems  · an unusual or allergic reaction to aspirin, tartrazine dye, other medicines, dyes, or preservatives  · pregnant or trying to get pregnant  · breast-feeding  How should I use this medicine?  Take this medicine by mouth with a glass of water. Follow the directions on the package or prescription label. You can take this medicine with or without food. If it upsets your stomach, take it with food. Do not take your medicine more often than directed.  Talk to your pediatrician regarding the use of this medicine in children. While this drug may be prescribed for children as young as 12 years of age for selected conditions, precautions do apply. Children and teenagers should not use this medicine to treat chicken pox or flu symptoms unless directed by a doctor.  Patients over 65 years old may have a stronger reaction and need a smaller dose.  Overdosage: If you think you have taken too much of this medicine contact a poison control center or emergency room at once.  NOTE: This medicine is only for you. Do not share this medicine with others.  What if I miss a dose?  If you are taking this medicine on a regular schedule and miss a dose, take  it as soon as you can. If it is almost time for your next dose, take only that dose. Do not take double or extra doses.  What may interact with this medicine?  Do not take this medicine with any of the following medications:  · cidofovir  · ketorolac  · probenecid  This medicine may also interact with the following medications:  · alcohol  · alendronate  · bismuth subsalicylate  · flavocoxid  · herbal supplements like feverfew, garlic, eduard, ginkgo biloba, horse chestnut  · medicines for diabetes or glaucoma like acetazolamide, methazolamide  · medicines for gout  · medicines that treat or prevent blood clots like enoxaparin, heparin, ticlopidine, warfarin  · other aspirin and aspirin-like medicines  · NSAIDs, medicines for pain and inflammation, like ibuprofen or naproxen  · pemetrexed  · sulfinpyrazone  · varicella live vaccine  This list may not describe all possible interactions. Give your health care provider a list of all the medicines, herbs, non-prescription drugs, or dietary supplements you use. Also tell them if you smoke, drink alcohol, or use illegal drugs. Some items may interact with your medicine.  What should I watch for while using this medicine?  If you are treating yourself for pain, tell your doctor or health care professional if the pain lasts more than 10 days, if it gets worse, or if there is a new or different kind of pain. Tell your doctor if you see redness or swelling. Also, check with your doctor if you have a fever that lasts for more than 3 days. Only take this medicine to prevent heart attacks or blood clotting if prescribed by your doctor or health care professional.  Do not take aspirin or aspirin-like medicines with this medicine. Too much aspirin can be dangerous. Always read the labels carefully.  This medicine can irritate your stomach or cause bleeding problems. Do not smoke cigarettes or drink alcohol while taking this medicine. Do not lie down for 30 minutes after taking  this medicine to prevent irritation to your throat.  If you are scheduled for any medical or dental procedure, tell your healthcare provider that you are taking this medicine. You may need to stop taking this medicine before the procedure.  This medicine may be used to treat migraines. If you take migraine medicines for 10 or more days a month, your migraines may get worse. Keep a diary of headache days and medicine use. Contact your healthcare professional if your migraine attacks occur more frequently.  What side effects may I notice from receiving this medicine?  Side effects that you should report to your doctor or health care professional as soon as possible:  · allergic reactions like skin rash, itching or hives, swelling of the face, lips, or tongue  · breathing problems  · changes in hearing, ringing in the ears  · confusion  · general ill feeling or flu-like symptoms  · pain on swallowing  · redness, blistering, peeling or loosening of the skin, including inside the mouth or nose  · signs and symptoms of bleeding such as bloody or black, tarry stools; red or dark-brown urine; spitting up blood or brown material that looks like coffee grounds; red spots on the skin; unusual bruising or bleeding from the eye, gums, or nose  · trouble passing urine or change in the amount of urine  · unusually weak or tired  · yellowing of the eyes or skin  Side effects that usually do not require medical attention (report to your doctor or health care professional if they continue or are bothersome):  · diarrhea or constipation  · headache  · nausea, vomiting  · stomach gas, heartburn  This list may not describe all possible side effects. Call your doctor for medical advice about side effects. You may report side effects to FDA at 4-447-FDA-8470.  Where should I keep my medicine?  Keep out of the reach of children.  Store at room temperature between 15 and 30 degrees C (59 and 86 degrees F). Protect from heat and moisture. Do  not use this medicine if it has a strong vinegar smell. Throw away any unused medicine after the expiration date.  NOTE: This sheet is a summary. It may not cover all possible information. If you have questions about this medicine, talk to your doctor, pharmacist, or health care provider.  © 2020 [employer] (2018-01-30 10:42:13)      · Is patient discharged on Warfarin / Coumadin?   No     Depression / Suicide Risk    As you are discharged from this [facility] facility, it is important to learn how to keep safe from harming yourself.    Recognize the warning signs:  · Abrupt changes in personality, positive or negative- including increase in energy   · Giving away possessions  · Change in eating patterns- significant weight changes-  positive or negative  · Change in sleeping patterns- unable to sleep or sleeping all the time   · Unwillingness or inability to communicate  · Depression  · Unusual sadness, discouragement and loneliness  · Talk of wanting to die  · Neglect of personal appearance   · Rebelliousness- reckless behavior  · Withdrawal from people/activities they love  · Confusion- inability to concentrate     If you or a loved one observes any of these behaviors or has concerns about self-harm, here's what you can do:  · Talk about it- your feelings and reasons for harming yourself  · Remove any means that you might use to hurt yourself (examples: pills, rope, extension cords, firearm)  · Get professional help from the community (Mental Health, Substance Abuse, psychological counseling)  · Do not be alone:Call your Safe Contact- someone whom you trust who will be there for you.  · Call your local CRISIS HOTLINE 361-6596 or 170-539-3883  · Call your local Children's Mobile Crisis Response Team [place] (349) 475-6265 or www.[employer].Folkstr  · Call the toll free National Suicide Prevention Hotlines   · National Suicide Prevention Lifeline 374-175-YHWI (7364)  · National "[employer] Line Network  800-SUICIDE (341-8546)              Dr. Moreno's Discharge Instructions:  The first week after your joint replacement is a time to recover from the surgery. We expect light exercise to keep you active and mobile. Dr. Moreno requires a walker or cane for most patients in the first few days following surgery to prevent falls or complications.      Most patients are prescribed two medications for pain control: a narcotic such as Oxycodone or Hydrocodone or a milder medication called Tramadol. These can be alternated for pain control, and the priority is to decrease use of the stronger narcotic as soon as tolerated. In addition, you were prescribed Celebrex, an anti-inflammatory medication. Do not take any other types of anti-inflammatories with this medication. Last, you should take acetaminophen, either 500mg every 4 hours, or 1000mg every 8 hours, around the clock. Do not exceed 3000mg in a 24 hour period. The anti-inflammatory and acetaminophen will help reduce the amount of narcotic medication you require.      Take your pain medication as appropriate to ensure that your pain is not limiting your recovery. You'll be seen in clinic in 7-10 days (this appointment has already been made, call our office if you're unsure of the time). At that time, we'll prescribe your physical therapy to help with the recovery phase.      -Keep dressing clean and dry. Leave dressing in place until follow up. If you have an incisional vac dressing, the battery may die before your first post operative appointment, but you can leave the surgical dressing in place and it will be removed at your clinic visit. The battery of the dressing may die, and the sponge will inflate because it is no longer holding suction. You can still leave the dressing in place and it will be removed at the office. Call the office if you notice drainage from the surgical dressing.     -OK to shower, keep dressing in place. Pat dressing dry, do not rub incision     -Do  not soak incision in bath, hot tub or pool     -Follow up with Dr. Moreno at regularly scheduled time     -Call [facility] office at [phone] for questions     -Weight bearing status: as tolerated     -Take aspirin 81mg twice daily  for 1 month to prevent blood clots

## 2021-12-16 NOTE — DISCHARGE PLANNING
Anticipated Discharge Disposition: Home with DME walker    Action: LSW notified by TREY Little that pt needs walker.     LSW met with pt at bedside to collect walker choice. Pt gave verbal consent to send referral to [facility]. LSW notified TREY Little, she can grab a walker for pt.    Barriers to Discharge: None    Plan: LSW to fax DME choice to [facility].    1130: LSW faxed DME choice to [facility].

## 2021-12-16 NOTE — THERAPY
Occupational Therapy   Initial Evaluation     Patient Name: Lesley Hernandez  Age:  61 y.o., Sex:  female  Medical Record #: [id number]  Today's Date: 12/16/2021     Precautions  Precautions: (P) Weight Bearing As Tolerated Left Lower Extremity    Assessment  Patient is 61 y.o. female s/p Revision L TKA. A&Ox4, motivated for home today. Shows good balance,strength,safety,activity tolerance. Son and friend will be available to assist as needed upon dc. No further OT needs. DC ready from  OT.         Plan  Recommend Occupational Therapy for Evaluation only   DC Equipment Recommendations: None  Discharge Recommendations: Anticipate that the patient will have no further occupational therapy needs after discharge from the hospital      12/16/21 1015   Prior Living Situation   Prior Services None   Housing / Facility 1 Story House   Bathroom Set up Walk In Shower   Equipment Owned Crutches   Lives with - Patient's Self Care Capacity Adult Children   Prior Level of ADL Function   Self Feeding Independent   Grooming / Hygiene Independent   Bathing Independent   Dressing Independent   Toileting Independent   Prior Level of IADL Function   Medication Management Independent   Laundry Independent   Kitchen Mobility Independent   Finances Independent   Home Management Independent   Shopping Independent   Prior Level Of Mobility Independent Without Device in Home   Driving / Transportation Driving Independent   Occupation (Pre-Hospital Vocational) Employed Full Time   Leisure Interests Hobbies;Exercise;Pets   History of Falls   History of Falls No   Precautions   Precautions Weight Bearing As Tolerated Left Lower Extremity   Vitals   O2 Delivery Device None - Room Air   Pain 0 - 10 Group   Location Knee   Location Orientation Left   Therapist Pain Assessment Post Activity Pain Same as Prior to Activity;Nurse Notified   Cognition    Cognition / Consciousness WDL   Level of Consciousness Alert   Passive ROM Upper Body   Passive  ROM Upper Body WDL   Active ROM Upper Body   Active ROM Upper Body  WDL   Strength Upper Body   Upper Body Strength  WDL   Sensation Upper Body   Upper Extremity Sensation  WDL   Upper Body Muscle Tone   Upper Body Muscle Tone  WDL   Neurological Concerns   Neurological Concerns No   Coordination Upper Body   Coordination WDL   Balance Assessment   Sitting Balance (Static) Good   Sitting Balance (Dynamic) Good   Standing Balance (Static) Good   Standing Balance (Dynamic) Fair +   Weight Shift Sitting Good   Weight Shift Standing Good   Bed Mobility    Supine to Sit Modified Independent   ADL Assessment   Eating Independent   Grooming Independent   Upper Body Dressing Independent   Lower Body Dressing Modified Independent   Toileting Modified Independent   How much help from another person does the patient currently need...   Putting on and taking off regular lower body clothing? 4   Bathing (including washing, rinsing, and drying)? 3   Toileting, which includes using a toilet, bedpan, or urinal? 4   Putting on and taking off regular upper body clothing? 4   Taking care of personal grooming such as brushing teeth? 4   Eating meals? 4   6 Clicks Daily Activity Score 23   Functional Mobility   Sit to Stand Modified Independent   Bed, Chair, Wheelchair Transfer Supervised   Toilet Transfers Supervised   Transfer Method Stand Step   Activity Tolerance   Sitting in Chair >30   Sitting Edge of Bed 7   Standing 6

## 2022-01-10 NOTE — PROGRESS NOTES
Lesley Hernandez was admitted on 12/15/2021 for Failed total knee arthroplasty, initial encounter (Former[facility]) [T84.018A, Z96.659]  Prosthetic hip implant failure, initial encounter ([facility]) [T84.018A, Z96.649]  Arthrofibrosis of total knee arthroplasty, initial encounter ([facility]) [T84.82XA]  Patient was diagnosed with the above and underwent a revision knee arthroplasty by Dr. Ruiz Moreno on the date of admission. Please see dictated operative note for further information.    Hospital course:     The patient has done well, with no complications.  Patient denies chest pain, calf pain or shortness of breath.   Pain is well-controlled at present.  Patient is ambulating well with the use of an assistive device, and progressing in physical therapy.   Patient is neurologically and vascularly intact with palpable pedal pulses bilaterally.   Narcotic dosages were chosen by taking into account the the patient's previous history of opoid use and to ensure proper pain control after surgery    Discharge date: 12/16/21    Patient is being discharged after am physical therapy.     Allergies:  Amoxicillin, Nickel, Pcn [penicillins], Cephalexin, Ertapenem, Vancomycin, Sulfa drugs, and Tramadol       Medication List      START taking these medications      Instructions   aspirin 81 MG Chew chewable tablet  Commonly known as: Aspirin 81   Doctor's comments: For DVT prophylaxis  Chew 1 Tablet 2 (two) times a day for 28 days.  Dose: 81 mg     celecoxib 100 MG Caps  Commonly known as: CELEBREX   Take 1 Capsule by mouth 2 times a day.  Dose: 100 mg     polyethylene glycol/lytes 17 g Pack  Commonly known as: MiraLax   Take 1 Packet by mouth every day.  Dose: 17 g     traMADol 50 MG Tabs  Commonly known as: ULTRAM   Take 1 Tablet by mouth every four hours as needed for up to 30 days.  Dose: 50 mg        CHANGE how you take these medications      Instructions   * ondansetron 4 MG Tbdp  What changed: Another medication with the same name was  added. Make sure you understand how and when to take each.  Commonly known as: ZOFRAN ODT   Take 1 Tablet by mouth every 6 hours as needed for Nausea.  Dose: 4 mg     * ondansetron 4 MG Tbdp  What changed: You were already taking a medication with the same name, and this prescription was added. Make sure you understand how and when to take each.  Commonly known as: ZOFRAN ODT   Take 1 Tablet by mouth every 6 hours as needed.  Dose: 4 mg         * This list has 2 medication(s) that are the same as other medications prescribed for you. Read the directions carefully, and ask your doctor or other care provider to review them with you.            CONTINUE taking these medications      Instructions   AMINO ACIDS PO   Take  by mouth.     CASCARA SAGRADA PO   Take 1 Tab by mouth every day.  Dose: 1 Tablet     HYDROcodone/acetaminophen  MG Tabs  Commonly known as: NORCO   Take 1 Tablet by mouth 2 times a day as needed. PAIN  Dose: 1 Tablet     ibuprofen 400 MG Tabs  Commonly known as: MOTRIN   Take 400 mg by mouth every 6 hours as needed.  Dose: 400 mg     MAXIMUM GREEN LABEL PO   Take  by mouth.     mupirocin 2 % Oint  Commonly known as: BACTROBAN   Doctor's comments: Swab 2x daily in each nostril for 5 days  Swab 2x daily in each nostril for 5 days     VITAMIN C PO   Take 1 Tab by mouth every day.  Dose: 1 Tablet     vitamin D3 1000 Unit (25 mcg) Tabs  Commonly known as: cholecalciferol   Take 1,000 Units by mouth every day.  Dose: 1,000 Units        ASK your doctor about these medications      Instructions   doxycycline 100 MG Tabs  Commonly known as: VIBRAMYCIN  Ask about: Should I take this medication?   Doctor's comments: Prophylaxis for infection  Take 1 Tablet by mouth 2 times a day for 7 days.  Dose: 100 mg            Discharge Instructions:     Patient is instructed to ambulate and weight bear as tolerated with the use of an assistive device, and to continue physical therapy exercises given during this  hospital stay. Strict posterior hip precautions are to be observed for patients who underwent a total hip replacement.   Patient is to ice and elevate the surgical leg regularly, with pillows under the ankle, nothing is to be placed under the knee.   Patient was given detailed wound care instructions, and will leave the Incisional vac or silver dressing on until first post-op visit.   Aspirin 81mg twice daily for DVT prophylaxis.  Patient is to follow up with Dr. Moreno's office in 2 weeks.

## 2023-12-28 NOTE — OR NURSING
1514 Pt arrived from PACU post   ARTHROSCOPY, KNEE. Left General   MANIPULATION, KNEE Left General   LYSIS, ADHESIONS, LAPAROSCOPIC Left     , report received. Pt states pain is tollerable and denies nausea at this time. Pt dressing @ BS with assistance.    1533 Discharge instructions and medications gone over with Pt and ride. All questions answered. Pt meets DC criteria. PIV DC'd. Pt transported to POV via WC in stable condition.      negative ROM intact/no joint swelling/no calf tenderness/normal gait

## (undated) DEVICE — SODIUM CHL. IRRIGATION 0.9% 3000ML (4EA/CA 65CA/PF)

## (undated) DEVICE — CHLORAPREP 26 ML APPLICATOR - ORANGE TINT(25/CA)

## (undated) DEVICE — GLOVE BIOGEL PI ORTHO SZ 8 PF LF (40PR/BX)

## (undated) DEVICE — ELECTRODE DUAL RETURN W/ CORD - (50/PK)

## (undated) DEVICE — GLOVE BIOGEL INDICATOR SZ 8 SURGICAL PF LTX - (50/BX 4BX/CA)

## (undated) DEVICE — GENESIS PIN AND DRILL SET

## (undated) DEVICE — BLADE OSCILLATING 9.0X31.0X0.4MM LIKE STRYKER 2296-3-125

## (undated) DEVICE — BURR OSTEO ROUND 4.7MM

## (undated) DEVICE — MASK ANESTHESIA ADULT  - (100/CA)

## (undated) DEVICE — MASK AIRWAY SZ 2.5 UNIQUE SILICON (10EA/BX)

## (undated) DEVICE — SUTURE 1 VICRYL PLUS CTX - 8 X 18 INCH (12/BX)

## (undated) DEVICE — SENSOR SPO2 NEO LNCS ADHESIVE (20/BX) SEE USER NOTES

## (undated) DEVICE — SYSTEM PREVENA DRESSING CUSTOMIZABLE (5EA/CA)

## (undated) DEVICE — GLOVE, LITE (PAIR)

## (undated) DEVICE — LACTATED RINGERS INJ 1000 ML - (14EA/CA 60CA/PF)

## (undated) DEVICE — GOWN WARMING STANDARD FLEX - (30/CA)

## (undated) DEVICE — SUCTION INSTRUMENT YANKAUER BULBOUS TIP W/O VENT (50EA/CA)

## (undated) DEVICE — GLOVE BIOGEL M SZ 8 SURGICAL PF LTX - (50/BX 4BX/CA)

## (undated) DEVICE — LENS/HOOD FOR SPACESUIT - (32/PK) PEEL AWAY FACE

## (undated) DEVICE — NEPTUNE 4 PORT MANIFOLD - (20/PK)

## (undated) DEVICE — PROTECTOR ULNA NERVE - (36PR/CA)

## (undated) DEVICE — GLOVE BIOGEL PI INDICATOR SZ 8.5 SURGICAL PF LF - (50PR/BX 4BX/CA)

## (undated) DEVICE — ELECTRODE 850 FOAM ADHESIVE - HYDROGEL RADIOTRNSPRNT (50/PK)

## (undated) DEVICE — TOWEL STOP TIMEOUT SAFETY FLAG (40EA/CA)

## (undated) DEVICE — PIN TROCAR GEN 1/8X3 (4EA/BX)

## (undated) DEVICE — HEAD HOLDER JUNIOR/ADULT

## (undated) DEVICE — STOCKINETTE IMPERVIOUS 12X48 - STERILELF (10/CA)"

## (undated) DEVICE — GLOVE BIOGEL PI INDICATOR SZ 8.0 SURGICAL PF LF -(50/BX 4BX/CA)

## (undated) DEVICE — DRAPE LARGE 3 QUARTER - (20/CA)

## (undated) DEVICE — HUMID-VENT HEAT AND MOISTURE EXCHANGE- (50/BX)

## (undated) DEVICE — BAG SPONGE COUNT 10.25 X 32 - BLUE (250/CA)

## (undated) DEVICE — GLOVE BIOGEL PI ORTHO SZ 7.5 PF LF (40PR/BX)

## (undated) DEVICE — WATER IRRIGATION STERILE 1000ML (12EA/CA)

## (undated) DEVICE — HANDPIECE 10FT INTPLS SCT PLS IRRIGATION HAND CONTROL SET (6/PK)

## (undated) DEVICE — SUTURE GENERAL

## (undated) DEVICE — TIP INTPLS HFLO ML ORFC BTRY - (12/CS)  FOR SURGILAV

## (undated) DEVICE — SODIUM CHL IRRIGATION 0.9% 1000ML (12EA/CA)

## (undated) DEVICE — BAG, SPONGE COUNT 50600

## (undated) DEVICE — BLADE OSCILLATING 9MMX24.6MMX0.4MM LIKE STRYKER 2296-3-111

## (undated) DEVICE — TUBING PATIENT W/CONNECTOR REDEUCE (1EA)

## (undated) DEVICE — NEEDLE W/FACET TIP DULL VERSION W/STIMULATION CABLE SONOPLEX 21G X 4 (10/EA)"

## (undated) DEVICE — PACK KNEE ARTHROSCOPY SM OR - (2EA/CA)

## (undated) DEVICE — KIT ANESTHESIA W/CIRCUIT & 3/LT BAG W/FILTER (20EA/CA)

## (undated) DEVICE — ARTHROWAND TURBOVAC 3.5/90 SCT

## (undated) DEVICE — BLADE SAGITTAL SYSTEM 18MM

## (undated) DEVICE — BLOCK

## (undated) DEVICE — PACK TOTAL KNEE  (1/CA)

## (undated) DEVICE — TUBING PUMP WITH CONNECTOR REDEUCE (1EA)

## (undated) DEVICE — TUBE CONNECTING SUCTION - CLEAR PLASTIC STERILE 72 IN (50EA/CA)

## (undated) DEVICE — SUTURE 2-0 VICRYL PLUS CT-1 - 8 X 18 INCH(12/BX)

## (undated) DEVICE — SUTURE 3-0 MONOCRYL PLUS PS-1 - 27 INCH (36/BX)

## (undated) DEVICE — SPONGE GAUZE NON-STERILE 4X4 - (2000/CA 10PK/CA)

## (undated) DEVICE — KIT ROOM DECONTAMINATION

## (undated) DEVICE — CANISTER SUCTION RIGID RED 1500CC (40EA/CA)

## (undated) DEVICE — MIXER BONE CEMENT REVOLUTION - W/FEMORAL PRESSURIZER (6/CA)

## (undated) DEVICE — BURR ROUND 66MM

## (undated) DEVICE — GLOVE BIOGEL PI ULTRATOUCH SZ 7.0 SURGICAL PF LF- POWDER FREE (50/BX 4BX/CA)

## (undated) DEVICE — GLOVE BIOGEL SZ 8 SURGICAL PF LTX - (50PR/BX 4BX/CA)

## (undated) DEVICE — TOURNIQUET CUFF 34 X 4 ONE PORT DISP - STERILE (10/BX)

## (undated) DEVICE — GLOVE BIOGEL PI ORTHO SZ 7 PF LF (40PR/BX)

## (undated) DEVICE — GOWN SURGICAL X-LARGE ULTRA - FILM-REINFORCED (20/CA)

## (undated) DEVICE — TUBING, SPIROMETRY KIT

## (undated) DEVICE — SUTURE 3-0 ETHILON FS-1 - (36/BX) 30 INCH

## (undated) DEVICE — SHAVER BLDE5.5 AGGRSSVE FORMULA (5EA/BX)

## (undated) DEVICE — Device

## (undated) DEVICE — SHAVER4.0 AGGRESSIVE + FORMLA (5EA/BX)

## (undated) DEVICE — SUTURE 1 ETHIBOND CTX C/R 8-1 - (12/BX)

## (undated) DEVICE — SPONGE GAUZESTER 4 X 4 4PLY - (128PK/CA)

## (undated) DEVICE — PAD PREP 24 X 48 CUFFED - (100/CA)

## (undated) DEVICE — BLADE SAGITTAL 34MM